# Patient Record
Sex: FEMALE | Race: WHITE | Employment: OTHER | URBAN - METROPOLITAN AREA
[De-identification: names, ages, dates, MRNs, and addresses within clinical notes are randomized per-mention and may not be internally consistent; named-entity substitution may affect disease eponyms.]

---

## 2014-04-16 LAB — ANTIBODY: NORMAL

## 2020-06-12 ENCOUNTER — TELEPHONE (OUTPATIENT)
Dept: INTERNAL MEDICINE CLINIC | Age: 75
End: 2020-06-12

## 2020-07-24 LAB
ALBUMIN SERPL-MCNC: 4 G/DL
ALP BLD-CCNC: 83 U/L
ALT SERPL-CCNC: 26 U/L
ANION GAP SERPL CALCULATED.3IONS-SCNC: 11 MMOL/L
AST SERPL-CCNC: 20 U/L
AVERAGE GLUCOSE: 114
BASOPHILS ABSOLUTE: 0.04 /ΜL
BASOPHILS RELATIVE PERCENT: 0.7 %
BILIRUB SERPL-MCNC: 0.4 MG/DL (ref 0.1–1.4)
BUN BLDV-MCNC: 20 MG/DL
C-REACTIVE PROTEIN: 1.7
CALCIUM SERPL-MCNC: 8.8 MG/DL
CHLORIDE BLD-SCNC: 106 MMOL/L
CHOLESTEROL, TOTAL: 128 MG/DL
CHOLESTEROL/HDL RATIO: 1.67
CO2: 26 MMOL/L
CREAT SERPL-MCNC: 0.77 MG/DL
EOSINOPHILS ABSOLUTE: 0.25 /ΜL
EOSINOPHILS RELATIVE PERCENT: 4.7 %
FERRITIN: 80.7 NG/ML (ref 9–150)
GFR CALCULATED: NORMAL
GLUCOSE BLD-MCNC: 96 MG/DL
HBA1C MFR BLD: 5.6 %
HCT VFR BLD CALC: 42 % (ref 36–46)
HDLC SERPL-MCNC: 39 MG/DL (ref 35–70)
HEMOGLOBIN: 13.9 G/DL (ref 12–16)
IRON: 114
LDL CHOLESTEROL CALCULATED: 65 MG/DL (ref 0–160)
LYMPHOCYTES ABSOLUTE: 1.49 /ΜL
LYMPHOCYTES RELATIVE PERCENT: 27.7 %
MCH RBC QN AUTO: 30.6 PG
MCHC RBC AUTO-ENTMCNC: 33.1 G/DL
MCV RBC AUTO: 92.5 FL
MONOCYTES ABSOLUTE: 0.65 /ΜL
MONOCYTES RELATIVE PERCENT: 12.1 %
NEUTROPHILS ABSOLUTE: 2.93 /ΜL
NEUTROPHILS RELATIVE PERCENT: 54.8 %
NONHDLC SERPL-MCNC: 89 MG/DL
PLATELET # BLD: 197 K/ΜL
PMV BLD AUTO: 10.6 FL
POTASSIUM SERPL-SCNC: 4.2 MMOL/L
RBC # BLD: 4.54 10^6/ΜL
SODIUM BLD-SCNC: 143 MMOL/L
T4 FREE: 1.1
TOTAL IRON BINDING CAPACITY: 248
TOTAL PROTEIN: 6
TRIGL SERPL-MCNC: 119 MG/DL
TSH SERPL DL<=0.05 MIU/L-ACNC: 3.63 UIU/ML
VITAMIN B-12: 236
VITAMIN D 25-HYDROXY: 26.7
VITAMIN D2, 25 HYDROXY: NORMAL
VITAMIN D3,25 HYDROXY: NORMAL
VLDLC SERPL CALC-MCNC: 24 MG/DL
WBC # BLD: 5.37 10^3/ML

## 2020-07-29 ENCOUNTER — OFFICE VISIT (OUTPATIENT)
Dept: INTERNAL MEDICINE CLINIC | Age: 75
End: 2020-07-29
Payer: MEDICARE

## 2020-07-29 VITALS
TEMPERATURE: 97.3 F | RESPIRATION RATE: 12 BRPM | BODY MASS INDEX: 26.82 KG/M2 | DIASTOLIC BLOOD PRESSURE: 62 MMHG | WEIGHT: 161 LBS | HEART RATE: 65 BPM | HEIGHT: 65 IN | SYSTOLIC BLOOD PRESSURE: 100 MMHG

## 2020-07-29 PROBLEM — E78.5 HYPERLIPIDEMIA: Status: ACTIVE | Noted: 2020-07-29

## 2020-07-29 PROBLEM — I47.10 PSVT (PAROXYSMAL SUPRAVENTRICULAR TACHYCARDIA): Status: ACTIVE | Noted: 2020-07-29

## 2020-07-29 PROBLEM — E55.9 VITAMIN D DEFICIENCY: Status: ACTIVE | Noted: 2020-07-29

## 2020-07-29 PROBLEM — I47.1 PSVT (PAROXYSMAL SUPRAVENTRICULAR TACHYCARDIA) (HCC): Status: ACTIVE | Noted: 2020-07-29

## 2020-07-29 PROBLEM — K63.5 POLYP OF COLON: Status: ACTIVE | Noted: 2020-07-29

## 2020-07-29 PROBLEM — I70.0 ABDOMINAL AORTIC ATHEROSCLEROSIS (HCC): Status: ACTIVE | Noted: 2020-07-29

## 2020-07-29 PROBLEM — I10 BENIGN ESSENTIAL HTN: Status: ACTIVE | Noted: 2020-07-29

## 2020-07-29 PROBLEM — I87.2 VENOUS INSUFFICIENCY OF BOTH LOWER EXTREMITIES: Status: ACTIVE | Noted: 2020-07-29

## 2020-07-29 PROBLEM — E04.2 MULTINODULAR GOITER: Status: ACTIVE | Noted: 2020-07-29

## 2020-07-29 PROBLEM — M85.80 OSTEOPENIA: Status: ACTIVE | Noted: 2020-07-29

## 2020-07-29 PROCEDURE — 3017F COLORECTAL CA SCREEN DOC REV: CPT | Performed by: INTERNAL MEDICINE

## 2020-07-29 PROCEDURE — G8400 PT W/DXA NO RESULTS DOC: HCPCS | Performed by: INTERNAL MEDICINE

## 2020-07-29 PROCEDURE — 1123F ACP DISCUSS/DSCN MKR DOCD: CPT | Performed by: INTERNAL MEDICINE

## 2020-07-29 PROCEDURE — 1090F PRES/ABSN URINE INCON ASSESS: CPT | Performed by: INTERNAL MEDICINE

## 2020-07-29 PROCEDURE — G8428 CUR MEDS NOT DOCUMENT: HCPCS | Performed by: INTERNAL MEDICINE

## 2020-07-29 PROCEDURE — 4040F PNEUMOC VAC/ADMIN/RCVD: CPT | Performed by: INTERNAL MEDICINE

## 2020-07-29 PROCEDURE — G8417 CALC BMI ABV UP PARAM F/U: HCPCS | Performed by: INTERNAL MEDICINE

## 2020-07-29 PROCEDURE — 1036F TOBACCO NON-USER: CPT | Performed by: INTERNAL MEDICINE

## 2020-07-29 PROCEDURE — 99205 OFFICE O/P NEW HI 60 MIN: CPT | Performed by: INTERNAL MEDICINE

## 2020-07-29 RX ORDER — OLMESARTAN MEDOXOMIL AND HYDROCHLOROTHIAZIDE 20/12.5 20; 12.5 MG/1; MG/1
1 TABLET ORAL DAILY
COMMUNITY
End: 2020-08-03 | Stop reason: SDUPTHER

## 2020-07-29 SDOH — HEALTH STABILITY: MENTAL HEALTH: HOW OFTEN DO YOU HAVE A DRINK CONTAINING ALCOHOL?: NEVER

## 2020-07-29 ASSESSMENT — PATIENT HEALTH QUESTIONNAIRE - PHQ9
SUM OF ALL RESPONSES TO PHQ QUESTIONS 1-9: 0
SUM OF ALL RESPONSES TO PHQ9 QUESTIONS 1 & 2: 0
SUM OF ALL RESPONSES TO PHQ QUESTIONS 1-9: 0
2. FEELING DOWN, DEPRESSED OR HOPELESS: 0
1. LITTLE INTEREST OR PLEASURE IN DOING THINGS: 0

## 2020-07-29 NOTE — PATIENT INSTRUCTIONS
To do list:      #1 start a vitamin D3 supplement. You can pick this up over-the-counter. Start 1000 units daily. This is good for your bones    #2 you should be getting at least 1200 mg of calcium daily. It is best to try to get this through the diet but if you cannot you can start a calcium supplement    #3 we will recheck your thyroid ultrasound in 6 months    #4 please see the cardiology electrophysiology specialist    #5  Wear your compression stockings daily      Patient Education        Learning About Low Bone Density  What is low bone density? Low bone density (sometimes called osteopenia) is a decrease in thickness, or density, in bones. That means the bones become thinner and weaker. It is much more common in women than in men. It's important to know that low bone density is not a disease. It can happen normally with aging. Having low bone density means that there is a greater risk that you may get osteoporosis. It also means that you are more likely to break a bone than someone who does not have low bone density. But not everyone with low bone density gets osteoporosis or breaks a bone. Low bone density doesn't cause any symptoms. It's usually found with a type of X-ray called a bone density test. Low bone density means that your bone density result (T-score) is between -1.0 and -2.5. What increases your risk for low bone density? Things that increase your risk include:  · Getting older. · Having a family history of osteoporosis. · Being thin. · Being white or . · Getting too little physical activity. · Smoking. · Drinking too much alcohol often. · Using certain medicines such as steroids. How can you prevent osteoporosis? There are things you can do to help prevent osteoporosis. Certain lifestyle changes will help slow the loss of bone density. · Eat food that has plenty of calcium and vitamin D. Yogurt, cheese, milk, and dark green vegetables are high in calcium.  Eggs, fatty fish, cereal, and fortified milk are high in vitamin D.  · Ask your doctor if you need to take a calcium plus vitamin D supplement. You may be able to get enough calcium and vitamin D through your diet. · Get regular exercise. ? Do 30 minutes of weight-bearing exercise on most days of the week. Walking, jogging, stair climbing, and dancing are good choices. ? Do resistance exercises with weights or elastic bands 2 or 3 days a week. · Limit alcohol to 2 drinks a day for men and 1 drink a day for women. Too much alcohol can cause health problems. · Do not smoke. Smoking can make bones thin faster. If you need help quitting, talk to your doctor about stop-smoking programs and medicines. These can increase your chances of quitting for good. Prescription medicines are available for treating low bone density. But these are more often used to treat osteoporosis. Follow-up care is a key part of your treatment and safety. Be sure to make and go to all appointments, and call your doctor if you are having problems. It's also a good idea to know your test results and keep a list of the medicines you take. Where can you learn more? Go to [email or web address] org and sign in to your CONWEAVER account. Enter W919 in the [facility] box to learn more about \"Learning About Low Bone Density. \"     If you do not have an account, please click on the \"Sign Up Now\" link. Current as of: August 7, 2019               Content Version: 12.5  © 9209-3230 Healthwise, Incorporated. Care instructions adapted under license by [facility] ([facility]). If you have questions about a medical condition or this instruction, always ask your healthcare professional. [place] [address] any warranty or liability for your use of this information. Patient Education        Learning About Vitamin D  Why is it important to get enough vitamin D? Your body needs vitamin D to absorb calcium.  Calcium keeps your bones and muscles, including your heart, healthy and strong. If your muscles don't get enough calcium, they can cramp, hurt, or feel weak. You may have long-term (chronic) muscle aches and pains. If you don't get enough vitamin D throughout life, you have an increased chance of having thin and brittle bones (osteoporosis) in your later years. Children who don't get enough vitamin D may not grow as much as others their age. They also have a chance of getting a rare disease called rickets. It causes weak bones. Vitamin D and calcium are added to many foods. And your body uses sunshine to make its own vitamin D. How much vitamin D do you need? The [place] of Medicine recommends that people ages 3 through 79 get 600 IU (international units) every day. Adults 71 and older need 800 IU every day. Blood tests for vitamin D can check your vitamin D level. But there is no standard normal range used by all laboratories. You're likely getting enough vitamin D if your levels are in the range of 20 to 50 ng/mL. How can you get more vitamin D? Foods that contain vitamin D include:  · [place], tuna, and mackerel. These are some of the best foods to eat when you need to get more vitamin D.  · Cheese, egg yolks, and beef liver. These foods have vitamin D in small amounts. · Milk, soy drinks, orange juice, yogurt, margarine, and some kinds of cereal have vitamin D added to them. Some people don't make vitamin D as well as others. They may have to take extra care in getting enough vitamin D. Things that reduce how much vitamin D your body makes include:  · Dark skin, such as many  Americans have. · Age, especially if you are older than 72. · Digestive problems, such as Crohn's or celiac disease. · Liver and kidney disease. Some people who do not get enough vitamin D may need supplements. Are there any risks from taking vitamin D?  · Too much vitamin D:  ? Can damage your kidneys.   ? Can cause nausea and vomiting, constipation, and weakness. ? Raises the amount of calcium in your blood. If this happens, you can get confused or have an irregular heart rhythm. · Vitamin D may interact with other medicines. Tell your doctor about all of the medicines you take, including over-the-counter drugs, herbs, and pills. Tell your doctor about all of your current medical problems. Where can you learn more? Go to [email or web address]://[email or web address] and sign in to your ChipIn account. Enter [id number] in the [employer] box to learn more about \"Learning About Vitamin D. \"     If you do not have an account, please click on the \"Sign Up Now\" link. Current as of: August 22, 2019               Content Version: 12.5  © 2006-2020 Healthwise, AgroSavfe. Care instructions adapted under license by [facility] ([facility]). If you have questions about a medical condition or this instruction, always ask your healthcare professional. [place] [address] any warranty or liability for your use of this information. Patient Education        Learning About Calcium  What is calcium? Calcium keeps your bones and muscles--including your heart--healthy and strong. Your body needs vitamin D to absorb calcium. People who don't get enough calcium and vitamin D throughout life have an increased chance of having thin and brittle bones (osteoporosis) in their later years. Thin and brittle bones break easily. They can lead to serious injuries. This is why it's important for you to get enough calcium and vitamin D as a child and as an adult. It helps keep your bones strong as you get older. And it protects you against possible breaks. Your body also uses vitamin D to help your muscles absorb calcium and work well. If your muscles don't get enough calcium, then they can cramp, hurt, or feel weak. You may have long-term (chronic) muscle aches and pains. How much calcium do you need?   How much calcium you need each day changes as you age. The [place] of Medicine recommends the following amounts of calcium each day. · Ages 1 to 3 years: 700 milligrams  · Ages 4 to 8 years: 1,000 milligrams  · Ages 5 to 25 years: 1,300 milligrams  · Ages 23 to 48 years: 1,000 milligrams  · Males 46 to 79 years: 1,000 milligrams  · Females 46 to 79 years: 1,200 milligrams  · Ages 70 and older: 1,200 milligrams  Women who are pregnant or breastfeeding need the same amount of calcium and vitamin D as other women their age. How can you get enough calcium? Calcium is in foods such as milk, cheese, and yogurt. Vegetables like broccoli, kale, and Chinese cabbage also have it. You can get calcium if you eat the soft edible bones in canned sardines and canned salmon. Foods with added (fortified) calcium include some cereals, juices, soy drinks, and tofu. The food label will show how much of it was added. You can figure out how much calcium is in a food by looking at the percent daily value section on the nutrition facts label. The food label assumes the daily value of calcium is 1,000 mg. So if one serving of a food has a daily value of 20% of calcium, that food has 200 mg of calcium in one serving. Some people who don't get enough calcium may need supplements. Two common calcium supplements are calcium citrate and calcium carbonate. Calcium carbonate is best absorbed when it is taken with food. Calcium citrate can be absorbed well with or without food. Spreading calcium out over the course of the day can reduce stomach upset. And your body absorbs it better when it is spread over the day. Try not to take more than 500 mg of calcium supplement at a time. Where can you learn more? Go to [email or web address] [email or web address] and sign in to your Apps4Pro account. Enter S264 in the [employer] box to learn more about \"Learning About Calcium. \"     If you do not have an account, please click on the \"Sign Up Now\" link.   Current as of: August 22, They also can help venous skin ulcers heal. But there are different types of stockings, and they need to fit right. So your doctor will recommend what you need. You also can try to:  · Get more exercise, especially walking. It can increase blood flow. · Avoid standing still or sitting for a long time, which can make the fluid pool in your legs. · Try not to sit with your legs crossed at the knee. · Keep your legs raised above your heart when you're lying down. This reduces swelling. If these treatments don't work, you may need medicine or a procedure to help relieve symptoms. Procedures might be done to close the vein, to remove the vein, or to improve blood flow. Follow-up care is a key part of your treatment and safety. Be sure to make and go to all appointments, and call your doctor if you are having problems. It's also a good idea to know your test results and keep a list of the medicines you take. Current as of: March 4, 2020               Content Version: 12.5  © 2006-2020 Socowave. Care instructions adapted under license by [facility] ([facility]). If you have questions about a medical condition or this instruction, always ask your healthcare professional. [address] any warranty or liability for your use of this information. Patient Education        Thyroid Nodules: Care Instructions  Your Care Instructions  Thyroid nodules are growths or lumps in the thyroid gland. Your thyroid is in the front of your neck. It controls how your body uses energy. You may have tests to see if the nodule is caused by cancer. Most nodules aren't cancer and don't cause problems. Many don't even need treatment. If you do have cancer, it can usually be cured. Treatment will probably include surgery. You may also get radioactive iodine treatment. If your thyroid can't make thyroid hormone after treatment, you can take a pill every day to replace the hormone.   Follow-up care is a key part of your treatment and safety. Be sure to make and go to all appointments, and call your doctor if you are having problems. It's also a good idea to know your test results and keep a list of the medicines you take. How can you care for yourself at home? · Be safe with medicines. If you take thyroid hormone medicine:  ? Take it exactly as prescribed. Call your doctor if you think you are having a problem with your medicine. If you take the right amount and don't skip doses, you probably won't have side effects. ? Do not take it with calcium, vitamins, or iron. ? Try not to miss a dose. ? Do not take extra doses. This will not help you get better any faster. It may also cause side effects. ? Tell your doctor about any medicines you take. This includes over-the-counter medicines. ? Wear a medical alert bracelet or necklace that says you take thyroid hormones. You can buy these at most drugstores. When should you call for help? [id number] anytime you think you may need emergency care. For example, call if:  · You lose consciousness. Call your doctor now or seek immediate medical care if:  · You have shortness of breath. Watch closely for changes in your health, and be sure to contact your doctor if:  · You have pain in your neck, jaw, or ear. · You have problems swallowing. · You feel weak and tired. · You have nervousness, a fast heartbeat, hand tremors, problems sleeping, increased sweating, and weight loss. · You do not feel better even though you are taking your medicine. Where can you learn more? Go to [email or web address]://Adello [email or web address] org and sign in to your Fertility Focus account. Enter S517 in the [facility] box to learn more about \"Thyroid Nodules: Care Instructions. \"     If you do not have an account, please click on the \"Sign Up Now\" link. Current as of: July 29, 2019               Content Version: 12.5  © 4285-5733 Healthwise, Incorporated.    Care instructions adapted under license by [facility] ([facility]). If you have questions about a medical condition or this instruction, always ask your healthcare professional. [place] [address] any warranty or liability for your use of this information. Patient Education        Supraventricular Tachycardia: Care Instructions  Your Care Instructions     Having supraventricular tachycardia (SVT) means that from time to time your heart beats abnormally fast. This fast rhythm is caused by changes in the electrical system of your heart. You may feel a fluttering in your chest (palpitations) and have a fast pulse. When your heart is beating fast, you may feel anxious and lightheaded, be short of breath, and feel discomfort in the chest.  Your doctor may prescribe medicines to help slow down your heartbeat. Your doctor may also suggest you try vagal maneuvers when having an episode of SVT. These are things, like bearing down, that might help slow your heart rate. Bearing down means that you try to breathe out with your stomach muscles but you don't let air out of your nose or mouth. Your doctor can show you how to do vagal maneuvers. He or she may suggest you lie down on your back to do them. In some cases, either cardioversion treatment or a procedure called catheter ablation is done to correct SVT. Your doctor may ask you to wear a small electronic device for 1 or 2 days to monitor your heart. It is called a Holter monitor. Follow-up care is a key part of your treatment and safety. Be sure to make and go to all appointments, and call your doctor if you are having problems. It's also a good idea to know your test results and keep a list of the medicines you take. How can you care for yourself at home? · Be safe with medicines. Take your medicines exactly as prescribed. Call your doctor if you think you are having a problem with your medicine. You will get more details on the specific medicines your doctor prescribes.   · If your doctor showed you how to do vagal maneuvers, try them when you have an episode. These maneuvers include bearing down or putting an ice-cold, wet towel on your face. · Monitor your condition by keeping a diary of your SVT episodes. Bring this to your doctor appointments. ? Write down how fast or slow your heart was beating. To count your heart rate:  § Gently place 2 fingers of your hand on the inside of your other wrist, below your thumb. § Count the beats for 30 seconds. § Then, double the result to get the number of beats per minute. ? Write down if your heart rhythm was regular or irregular. ? Write down the symptoms you had.  ? Write down the time of day your symptoms occurred. ? Write down how long your symptoms lasted. ? Write down what you were doing when your symptoms started. ? Write down what may have helped your symptoms go away. · If they trigger episodes, limit or avoid alcohol or drinks with caffeine. · Do not use over-the-counter decongestants, herbal remedies, diet pills, or \"pep\" pills, which often contain stimulants. · Do not use illegal drugs, such as cocaine, ecstasy, or methamphetamine, which can speed up your heart's rhythm. · Do not smoke. Smoking can make this condition worse. If you need help quitting, talk to your doctor about stop-smoking programs and medicines. These can increase your chances of quitting for good. · Be alert for new or worsening symptoms, such as shortness of breath, pounding of your heart, or unusual tiredness. If new symptoms develop or your symptoms become worse, call your doctor. When should you call for help? [id number] anytime you think you may need emergency care. For example, call if:  · You passed out (lost consciousness). · You are short of breath. Call your doctor now or seek immediate medical care if:  · You have a fast heartbeat. · You are dizzy or lightheaded, or feel like you may faint.   Watch closely for changes in your health, and be sure to contact your doctor if:  · You do not get better as expected. Where can you learn more? Go to [email or web address] and sign in to your EUROBOX account. Enter G244 in the [facility] box to learn more about \"Supraventricular Tachycardia: Care Instructions. \"     If you do not have an account, please click on the \"Sign Up Now\" link. Current as of: December 16, 2019               Content Version: 12.5  © 8692-5458 Healthwise, Incorporated. Care instructions adapted under license by [facility] ([facility]). If you have questions about a medical condition or this instruction, always ask your healthcare professional. [place] [address] any warranty or liability for your use of this information. Patient Education        Low Sodium Diet (2,000 Milligram): Care Instructions  Your Care Instructions     Too much sodium causes your body to hold on to extra water. This can raise your blood pressure and force your heart and kidneys to work harder. In very serious cases, this could cause you to be put in the hospital. It might even be life-threatening. By limiting sodium, you will feel better and lower your risk of serious problems. The most common source of sodium is salt. People get most of the salt in their diet from canned, prepared, and packaged foods. Fast food and restaurant meals also are very high in sodium. Your doctor will probably limit your sodium to less than 2,000 milligrams (mg) a day. This limit counts all the sodium in prepared and packaged foods and any salt you add to your food. Follow-up care is a key part of your treatment and safety. Be sure to make and go to all appointments, and call your doctor if you are having problems. It's also a good idea to know your test results and keep a list of the medicines you take. How can you care for yourself at home? Read food labels  · Read labels on cans and food packages.  The labels tell you how much sodium is in by too little iodine in the diet, or a growth or disease in the thyroid. In the [place], most goiters are caused by long-term autoimmune thyroiditis. This is also called Hashimoto's thyroiditis. It happens when the body's immune system damages the thyroid. You may take thyroid hormone to reduce the size of your goiter. Or you may need surgery or radioactive iodine treatment. Some people don't need any treatment. They only need to watch for changes in the goiter. Follow-up care is a key part of your treatment and safety. Be sure to make and go to all appointments, and call your doctor if you are having problems. It's also a good idea to know your test results and keep a list of the medicines you take. How can you care for yourself at home? · Be safe with medicines. Take your medicines exactly as prescribed. Call your doctor if you think you are having a problem with your medicine. You will get more details on the specific medicines your doctor prescribes. When should you call for help? [id number] anytime you think you may need emergency care. For example, call if:  · You have trouble breathing. Watch closely for changes in your health, and be sure to contact your doctor if:  · Your eyes turn red and bulge. · You have trouble swallowing. · You feel very tired or weak. · You lose weight but are eating the same or more than usual.  Where can you learn more? Go to https://ViacorepeFingooroo.[email or web address] org and sign in to your FortaTrust account. Enter A754 in the [facility] box to learn more about \"Goiter: Care Instructions. \"     If you do not have an account, please click on the \"Sign Up Now\" link. Current as of: July 29, 2019               Content Version: 12.5  © 2491-6360 Healthwise, Incorporated. Care instructions adapted under license by [facility] ([facility]).  If you have questions about a medical condition or this instruction, always ask your healthcare professional. Jag Hirschs

## 2020-07-29 NOTE — PROGRESS NOTES
[facility]) Physicians  Internal Medicine  Patient Encounter  Ki Elizabeth D.O., [facility]          Chief Complaint   Patient presents with   Karen Raza    Medication Check    Results     From [address]       [facility]-- 77 y.o. female seen today as a new patient in order to establish new primary care. She was referred by another patient. She also states that many of her family members were osteopath's. She is requesting a full evaluation regarding the status of her current chronic medical problems as below along with medication review and reconciliation. 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 Patient had lab, EKG stress test, DEXA scan, thyroid ultrasound, vascular screenings. These were all reviewed in the electronic health record via [employer]. Her previous primary care physician was Dr. Mariama Abdalla. She was last seen on 4/7/2020. Office note is reviewed in the electronic health record via [employer]. PSVT--Patient does have a remote history of palpitations. However the SVT that was identified up in [facility] [employer] was asymptomatic. The report of her stress test indicates that she had a sustained bout of SVT but also had an 8 beat run of SVT while at rest.  Patient states she feels great and offers no symptoms at this time. TSH was normal.    Hypertension--patient states that she has been on medication for several years. She denies any problems with the Benicar HCT. She denies any lightheadedness, headaches, episodes of unilateral weakness, paresthesias, speech or visual disturbances. She has had no syncopal episodes. Hyperlipidemia--Patient remains on simvastatin. She is tolerated this medication well. She denies any new myalgias or unexplained muscle weakness. Her last lab work on 7/24/2020 at the [facility] showed an LDL of 65.   Her HDL was 39    Osteopenia--patient had her DEXA scan recently on 7/24/2020 up at the [facility]. This showed osteopenic T-scores. Her FRAX score was 12% for osteoporotic fracture and 2.5% risk of hip fracture in the next 10 years. With these risk scores bisphosphonate therapy may not be warranted. She is never had a fracture. She is physically active. She is not taking calcium or vitamin D. Colon polyps--patient states she has had a colonoscopy back in 2018. She cannot remember who did it. She has a history of colon polyps. Medical/Surgical Histories     Past Medical History:   Diagnosis Date    Abdominal aortic atherosclerosis ([facility] [address] 7/29/2020    Colon polyps     Hyperlipidemia     Hypertension     Multinodular goiter 7/29/2020    Osteopenia     PSVT (paroxysmal supraventricular tachycardia) ([facility] [address] 07/24/2020    Diagnosed during stress test at [facility] Venous insufficiency of both lower extremities 7/29/2020      No prior H/O DVT, PE or bleeding dyscrasias    Past Surgical History:   Procedure Laterality Date    BREAST LUMPECTOMY Right 1970    CATARACT REMOVAL WITH IMPLANT Bilateral 2012    Dr. Leah Parada  2018   Sarah Forno 77      7 yo           Medications/Allergies     Current Outpatient Medications   Medication Sig Dispense Refill    olmesartan-hydroCHLOROthiazide (BENICAR HCT) 20-12.5 MG per tablet Take 1 tablet by mouth daily      simvastatin (ZOCOR) 20 MG tablet Take 20 mg by mouth nightly. No current facility-administered medications for this visit.          Allergies   Allergen Reactions    Dust Mite Extract          Substance Use History     Social History     Tobacco Use    Smoking status: Never Smoker    Smokeless tobacco: Never Used   Substance Use Topics    Alcohol use: Never     Frequency: Never    Drug use: Never          Family History     Family History   Problem Relation Age of Onset    Heart Disease Mother         CHF    Other Mother         PVD    Stroke Consciousness, memeory loss or forgetfulness, confusion, difficulty concentrating, seizures, insomina, aphasia or dysarthria unilateral weakness or paresthesias, ataxia, headaches, syncope, tremor, or H/O head trauma. PSYCHIATRIC:  Neg  Depression, anxiety, personality changes, nervousness, drug or alcohol use/abuse, H/O psych counseling: No, Psychotropics: No  SKIN :  Neg  Rash, nail changes, sun burns, tattoos, change in moles, or skin color changes  ENDOCRINE:  Neg  Polydipsia,polyuria,abnormal weight changes,heat /cold intolerance, Hair changes. No H/O Diabetes. Goiter (MNG). Thyroid nodules need followed. Physical Exam    /62   Pulse 65   Temp 97.3 °F (36.3 °C)   Resp 12   Ht 5' 4.5\" (1.638 m)   Wt 161 lb (73 kg)   BMI 27.21 kg/m²   GEN:  77 y.o. female who is in NAD, A&O. She appears stated age and well nourished. She is cooperative and pleasant. HEAD:  NC/AT, no lesions. EYES:  ALLA, EOMI, No scleral icterus or conjunctival injection or discharge. Visual fields in tact to confrontation. Fundoscopic (non-dilated) grossly normal.  Disc margins well demarcated. EARS:  EAC's clear, TM's normal.  NOSE:  Nasal cavity is clear. No mucosal congestion or discharge. Sinuses are nontender. MOUTH & THROAT:  Oral cavity is clear without mucosal lesions. Tongue is midline. Dentition is in good repair. No pharyngeal erythema or exudate. NECK:  Supple. Full ROM. Trachea is midline. No increased JVD. The thyroid does not feel enlarged and nodules are not palpated today. LYMPH: No C/SC/A/F nodes  CARDIAC:  S1S2 NL. Regular rhythm. No murmur/clicks/rubs. No ectopy. PMI is non-displaced. VASC:  Pedal pulses 2/4. Carotid upstrokes 2+. No bruits noted. PULM:  Lungs are CTA. Symmetric breath sounds noted. AP Diameter NL. GI:  Abdomen is soft and nontender. No distension. No organomegaly. No masses. No pulsatile masses.     : Deferred at this time  BREAST:  Deferred at this time  EXT:  No Cyanosis or clubbing. Bilateral lower extremities with trace pitting edema. SKIN: Warm and dry, normal turgor, no rash or lesions of concern. Bilateral lower extremities with venous stasis hyperpigmentation. NEURO:  Cranial nerves 2-12 are NL. Speech fluent and coherent. Strength is 5/5 in all muscle groups. No sensory deficits. No focal or lateralizing deficits. Reflexes 2/4 and symmetric. Gait is normal.  MS:  No C/T/L paraspinal tenderness. No scoliosis. No joint effusions. Full joint ROM. PSYCH:  Mood and affect NL. Judgement and insight NL.        ASSESSMENT/PLAN:    1. PSVT (paroxysmal supraventricular tachycardia) ([address]  This is a new problem. Referral to electrophysiology. The patient requests that she be referred to a [facility] specialist.  Continue to monitor for symptoms  No need for aspirin at this time  Avoid caffeinated beverages and cold medications  - External Referral To Cardiac Electrophysiology    2. Osteopenia, unspecified location  Condition is untreated at this time. This is a new diagnosis  Recommend vitamin D and calcium supplementation. We discussed getting vitamin D3 as a supplement and trying to get calcium in the diet. That which she does not get in the diet she can use an over-the-counter supplement. We talked about weightbearing exercise. Repeat DEXA scan in 2 years. Consider bisphosphonate therapy for prevention of osteoporosis. Could use a lower dose of Fosamax. 3. Multinodular goiter  Condition identified on an ultrasound  There are some concerning nodules. Repeat ultrasound in 6 months. 4. Benign essential HTN  Blood pressure is a bit on the low side today. Continue to stay well-hydrated  Monitor blood pressure at home    5. Hyperlipidemia, unspecified hyperlipidemia type  Condition is well controlled  Continue simvastatin as part of her cardiovascular disease risk reduction strategy    6.  Abdominal aortic atherosclerosis ([facility] [address]  Condition is asymptomatic  Continue aggressive cardiovascular disease risk factor management    7. Polyp of colon, unspecified part of colon, unspecified type  Colonoscopy in 2023    8. Venous insufficiency of both lower extremities  Condition is mild. Treat with compression stockings and no added salt diet. We also discussed elevation of her legs while at rest  Continue current blood pressure medication with the diuretic in it (olmesartan-HCT) but be careful with dehydration. 9. Vitamin D deficiency  Patient counseled. Vitamin D3 1000 units daily    90 minutes spent with the pt.  >50% spent reviewing test results, reports, imaging in the electronic health record and discussing plan of care and counseled on the above problems. Yue Sanchez

## 2020-08-03 RX ORDER — OLMESARTAN MEDOXOMIL AND HYDROCHLOROTHIAZIDE 20/12.5 20; 12.5 MG/1; MG/1
1 TABLET ORAL DAILY
Qty: 30 TABLET | Refills: 2 | Status: SHIPPED | OUTPATIENT
Start: 2020-08-03 | End: 2020-08-07 | Stop reason: RX

## 2020-08-03 NOTE — TELEPHONE ENCOUNTER
Patient has appt set up with cardiologist. On Sept 10th. Will need refill of olmesartan-hydroCHLOROthiazide (BENICAR HCT) 20-12.5 MG per tablet before then.  Please send to [facility] [address] in [place]

## 2020-08-03 NOTE — TELEPHONE ENCOUNTER
Last appointment: 7/29/2020  Next appointment: 10/27/2020  Last refill: 07/29/2020    Spoke with [facility], pharmacist who advises   prescription refills recently  sent to [facility] on 07/29/2020.

## 2020-08-07 ENCOUNTER — TELEPHONE (OUTPATIENT)
Dept: INTERNAL MEDICINE CLINIC | Age: 75
End: 2020-08-07

## 2020-08-07 NOTE — TELEPHONE ENCOUNTER
Pt currently on olmesartan-hydroCHLOROthiazide (BENICAR HCT) 20-12.5 MG per tablet. Pharmacist has not been able to get it in so she has been out for a couple of days. Pharmacy suggested she call us to see about getting the combination pill  into 2. Please send to [facility] [address]    Also last blood work through the [facility] showed low vitamin D. Says it was recommended she take a vitamin D supplement. Would like to know how much she should take.  Currently taking Calcium w/ D

## 2020-08-08 RX ORDER — HYDROCHLOROTHIAZIDE 12.5 MG/1
12.5 CAPSULE, GELATIN COATED ORAL DAILY
Qty: 90 CAPSULE | Refills: 1 | Status: SHIPPED | OUTPATIENT
Start: 2020-08-08 | End: 2020-10-27

## 2020-08-08 RX ORDER — OLMESARTAN MEDOXOMIL 20 MG/1
20 TABLET ORAL DAILY
Qty: 90 TABLET | Refills: 1 | Status: SHIPPED | OUTPATIENT
Start: 2020-08-08 | End: 2020-10-27

## 2020-09-01 ENCOUNTER — TELEPHONE (OUTPATIENT)
Dept: INTERNAL MEDICINE CLINIC | Age: 75
End: 2020-09-01

## 2020-09-01 NOTE — TELEPHONE ENCOUNTER
----- Message from Lambert Serve sent at 9/1/2020 10:59 AM EDT -----  Subject: Message to Provider    QUESTIONS  Information for Provider? pt would like to get a referral to a   dermatologist she has a spot on her chest that should would like looked   at.   ---------------------------------------------------------------------------  --------------  [address]  What is the best way for the office to contact you? OK to leave message on   voicemail  Preferred Call Back Phone Number? [phone]  ---------------------------------------------------------------------------  --------------  SCRIPT ANSWERS  Relationship to Patient?  Self

## 2020-10-23 ENCOUNTER — TELEPHONE (OUTPATIENT)
Dept: INTERNAL MEDICINE CLINIC | Age: 75
End: 2020-10-23

## 2020-10-23 RX ORDER — ATORVASTATIN CALCIUM 10 MG/1
10 TABLET, FILM COATED ORAL DAILY
Qty: 90 TABLET | Refills: 3 | Status: SHIPPED | OUTPATIENT
Start: 2020-10-23 | End: 2021-01-18 | Stop reason: SDUPTHER

## 2020-10-23 RX ORDER — OLMESARTAN MEDOXOMIL AND HYDROCHLOROTHIAZIDE 20/12.5 20; 12.5 MG/1; MG/1
1 TABLET ORAL DAILY
Qty: 90 TABLET | Refills: 3 | Status: SHIPPED | OUTPATIENT
Start: 2020-10-23 | End: 2021-01-05 | Stop reason: ALTCHOICE

## 2020-10-23 NOTE — TELEPHONE ENCOUNTER
Not sure I completely understand this message. I have her on simvastatin. Was there a change? Why does not her electrophysiologist prescribe her beta-blocker? We need to confirm these medications.

## 2020-10-23 NOTE — TELEPHONE ENCOUNTER
Patient states the Benicar is a combination 20/12. 5. If Dr. Golden Woody recommends Lipitor, she is fine with that. Patient states Dr. Stacie Weeks gave her a 30 days sample of Metoprolol and said \"we'll see how it goes\". States she has been doing fine and thought it would be best for 1 dr to prescribe all her medications. Advised patient if Nilam Roy is recommending that medication & monitoring it then he should be the one filling that prescription.      Please send in the Lipitor & Benicar 20/12.5

## 2020-10-23 NOTE — TELEPHONE ENCOUNTER
Last appointment: 7/29/2020  Next appointment: 10/27/2020  benicar and microzide have refills, lipitor not on list is he on lipitor or zocor?

## 2020-10-23 NOTE — TELEPHONE ENCOUNTER
I suggest the Lipitor. Can you again confirm exactly what she is taking and what she needs refilled.   Need the exact dosages and how she takes them

## 2020-10-23 NOTE — TELEPHONE ENCOUNTER
She is taking lipitor prescribed by her physician. Should she start on zocor now? She seemed confused about medication. This was from her previous primary.  Will take whichever medication you suggest

## 2020-10-27 ENCOUNTER — OFFICE VISIT (OUTPATIENT)
Dept: INTERNAL MEDICINE CLINIC | Age: 75
End: 2020-10-27
Payer: MEDICARE

## 2020-10-27 VITALS
SYSTOLIC BLOOD PRESSURE: 115 MMHG | TEMPERATURE: 97.4 F | BODY MASS INDEX: 27.14 KG/M2 | RESPIRATION RATE: 12 BRPM | DIASTOLIC BLOOD PRESSURE: 60 MMHG | HEIGHT: 64 IN | WEIGHT: 159 LBS | HEART RATE: 65 BPM

## 2020-10-27 PROCEDURE — G8484 FLU IMMUNIZE NO ADMIN: HCPCS | Performed by: INTERNAL MEDICINE

## 2020-10-27 PROCEDURE — G8417 CALC BMI ABV UP PARAM F/U: HCPCS | Performed by: INTERNAL MEDICINE

## 2020-10-27 PROCEDURE — G8427 DOCREV CUR MEDS BY ELIG CLIN: HCPCS | Performed by: INTERNAL MEDICINE

## 2020-10-27 PROCEDURE — 1123F ACP DISCUSS/DSCN MKR DOCD: CPT | Performed by: INTERNAL MEDICINE

## 2020-10-27 PROCEDURE — 1036F TOBACCO NON-USER: CPT | Performed by: INTERNAL MEDICINE

## 2020-10-27 PROCEDURE — 4040F PNEUMOC VAC/ADMIN/RCVD: CPT | Performed by: INTERNAL MEDICINE

## 2020-10-27 PROCEDURE — 3017F COLORECTAL CA SCREEN DOC REV: CPT | Performed by: INTERNAL MEDICINE

## 2020-10-27 PROCEDURE — 1090F PRES/ABSN URINE INCON ASSESS: CPT | Performed by: INTERNAL MEDICINE

## 2020-10-27 PROCEDURE — G8400 PT W/DXA NO RESULTS DOC: HCPCS | Performed by: INTERNAL MEDICINE

## 2020-10-27 PROCEDURE — 99214 OFFICE O/P EST MOD 30 MIN: CPT | Performed by: INTERNAL MEDICINE

## 2020-10-27 RX ORDER — ASPIRIN 81 MG/1
81 TABLET ORAL DAILY
COMMUNITY

## 2020-10-27 RX ORDER — METOPROLOL SUCCINATE 25 MG/1
25 TABLET, EXTENDED RELEASE ORAL DAILY
COMMUNITY
End: 2020-12-31 | Stop reason: DRUGHIGH

## 2020-10-27 RX ORDER — LANOLIN ALCOHOL/MO/W.PET/CERES
1000 CREAM (GRAM) TOPICAL DAILY
COMMUNITY

## 2020-10-27 NOTE — PROGRESS NOTES
[facility]) Physicians  Internal Medicine  Patient Encounter  Anant Long D.O., [facility]        Chief Complaint   Patient presents with    Medication Check       HPI: 77 y.o. female      PSVT--This was identified on a stress test.  She had this done at [facility]. She was referred to EP. She was seen by Dr. Daniella Tillman. He placed her on Toprol XL 25 mg daily. She had an echocardiogram on 9/22/2020 which showed left ventricular ejection fraction of 65% and no other concerning findings. A 7-day monitor was ordered which demonstrated frequent episodes of atrial tachycardia lasting up to 33 seconds at a rate of 140 bpm.  There were 1500 episodes over 7 days. She had a 5% burden of PACs. Records reviewed in the electronic health record. She is to return in 2 months to visit the nurse practitioner. He currently denies any symptoms of palpitations or heart racing. She used to have \"butterflies\" in her chest, and felt her heart beating harder. These symptoms are resolved. She avoid caffeine.       Hypertension--blood pressure on 10/20/2020 at the cardiology office was 124/64. Her pulse rate was 60. She is now on Benicar HCT 20-12.5 1 daily. She denies any lightheadedness or syncopal episodes.     Hyperlipidemia--at her first visit she indicated she was on simvastatin. Her lipid profile looked great with an LDL of 65. She then called on 10/23/2020 requesting atorvastatin. It is not clear with the atorvastatin came from. She was told to go ahead and take the atorvastatin and stop the simvastatin.     Colon polyps--patient states she has had a colonoscopy back in 8/2018. She cannot remember who did it. She has a history of colon polyps. 5 year recall    Abd Aortic atherosclerosis-- She denies abd pain or claudication. ECHO 9/22/2020:  Study Conclusions    - Left ventricle:  The cavity size is normal. Wall thickness is    normal. Systolic function was normal. The calculated ejection  fraction was 65%. Wall motion was normal; there were no regional    wall motion abnormalities. Doppler parameters are consistent with    abnormal left ventricular relaxation (grade 1 diastolic    dysfunction). The stroke volume is 83ml. The stroke index is    47ml/m^2. - Aortic valve: Thickening, consistent with sclerosis. The mean    systolic gradient is 8mm Hg. The peak systolic gradient is 91ST    Hg. The valve area by the velocity-time integral method is    1.8cm^2. The valve area index by the velocity-time integral    method is 1.04cm^2/m^2. The valve area by the mean velocity    method is 2.0cm^2. - Inferior vena cava: The vessel was mildly dilated; the    respirophasic diameter changes were in the normal range (&gt;= 50%);    findings are consistent with normal central venous pressure.     Past Medical History:   Diagnosis Date    Abdominal aortic atherosclerosis ([facility] 7/29/2020    Colon polyps     Hyperlipidemia     Hypertension     Multinodular goiter 7/29/2020    Osteopenia     PSVT (paroxysmal supraventricular tachycardia) ([facility] 07/24/2020    Diagnosed during stress test at [facility] Venous insufficiency of both lower extremities 7/29/2020         MEDICATIONS:  Medication Sig   aspirin 81 MG EC tablet Take 81 mg by mouth daily   metoprolol succinate (TOPROL XL) 25 MG extended release tablet Take 25 mg by mouth daily   Calcium Carbonate-Vitamin D (CALCIUM 600/VITAMIN D PO) Take 1 tablet by mouth 2 times daily   vitamin B-12 (CYANOCOBALAMIN) 1000 MCG tablet Take 1,000 mcg by mouth daily   Multiple Vitamins-Minerals (ICAPS AREDS 2 PO) Take 1 tablet by mouth 2 times daily   olmesartan-hydroCHLOROthiazide (BENICAR HCT) 20-12.5 MG per tablet Take 1 tablet by mouth daily   atorvastatin (LIPITOR) 10 MG tablet Take 1 tablet by mouth daily           Review of Systems - As per HPI      OBJECTIVE:  /60   Pulse 65   Temp 97.4 °F (36.3 °C)   Resp 12   Ht 5' 4\" (1.626 m)   Wt 159 lb (72.1 kg)   BMI 27.29 kg/m²   GEN: NAD, A&O, Non-toxic  HEENT: NC/AT, KATE, EOMI, TM's NL  NECK: Supple. No thyromegaly. No JVD  CV: Reg rhythm, rate NL with occasional ectopy (extrasystoles). Soft systolic murmur. LYMPH:  No C/SC nodes. PULM: CTA, symmentric air exchange  EXT: No C/C/E  GI: Abdomen is soft, NT, BS+, No hepatomegaly. No masses. NEURO: No focal or lateralizing deficits. VASC:  No carotid bruits. Pedal pulses symmetric  SKIN:  No rashes or lesions of concern      ASSESSMENT/PLAN:    1. Benign essential HTN  Blood pressure looks wonderfully controlled. Continue current dose of Benicar-HCT and Toprol-XL 25 mg daily. We talked about taking the beta-blocker in the evening. Continue to stay physically active. No added salt diet was discussed  - CBC Auto Differential; Future  - Comprehensive Metabolic Panel; Future  - Lipid Panel; Future    2. Hyperlipidemia, unspecified hyperlipidemia type  Condition control and stability are uncertain. We have confirmed that she is actually taking atorvastatin. This was called in for her. The [facility] medication list actually had simvastatin. We will go with the atorvastatin and repeat her lab work in 1 month  Continue to monitor for adverse effects  - Comprehensive Metabolic Panel; Future  - Lipid Panel; Future    3. Abdominal aortic atherosclerosis (HCC)  Condition is stable and asymptomatic  Continue aggressive cardiovascular risk factor management  - Lipid Panel; Future    4. PSVT (paroxysmal supraventricular tachycardia) (HCC)  Condition at this point is asymptomatic. Continue the beta-blocker  She has a follow-up with EP    5. B12 deficiency  Her last B12 level was at the low end of normal.  Continue oral supplementation. Recheck level  - Vitamin B12 & Folate; Future        Discussed medications with patient who voiced understanding of their use, indication and potential side effects. Pt also understands the above recommendations.    All

## 2020-11-09 ENCOUNTER — TELEPHONE (OUTPATIENT)
Dept: INTERNAL MEDICINE CLINIC | Age: 75
End: 2020-11-09

## 2020-11-09 NOTE — TELEPHONE ENCOUNTER
Would be happy to schedule a video visit to discuss exactly what the problem is and make proper referral.

## 2020-11-09 NOTE — TELEPHONE ENCOUNTER
----- Message from Kasia Rivas sent at 11/9/2020 10:16 AM EST -----  Subject: Referral Request    QUESTIONS   Reason for referral request? PT has ingrown toe nail and requesting for a   podiatrist specialist   Has the physician seen you for this condition before? No   Preferred Specialist (if applicable)? Do you already have an appointment scheduled? No  Additional Information for Provider? patient requesting for Dr. Deysi Rankin or   assistant to give her a call and also need recommendation to see a   podiatrist for ingrown toe nail  ---------------------------------------------------------------------------  --------------  CALL BACK INFO  What is the best way for the office to contact you? OK to leave message on   voicemail  Preferred Call Back Phone Number?  [phone]

## 2020-11-11 ENCOUNTER — VIRTUAL VISIT (OUTPATIENT)
Dept: INTERNAL MEDICINE CLINIC | Age: 75
End: 2020-11-11
Payer: MEDICARE

## 2020-11-11 PROCEDURE — 4040F PNEUMOC VAC/ADMIN/RCVD: CPT | Performed by: INTERNAL MEDICINE

## 2020-11-11 PROCEDURE — 3017F COLORECTAL CA SCREEN DOC REV: CPT | Performed by: INTERNAL MEDICINE

## 2020-11-11 PROCEDURE — 1123F ACP DISCUSS/DSCN MKR DOCD: CPT | Performed by: INTERNAL MEDICINE

## 2020-11-11 PROCEDURE — G8400 PT W/DXA NO RESULTS DOC: HCPCS | Performed by: INTERNAL MEDICINE

## 2020-11-11 PROCEDURE — 99213 OFFICE O/P EST LOW 20 MIN: CPT | Performed by: INTERNAL MEDICINE

## 2020-11-11 PROCEDURE — G8427 DOCREV CUR MEDS BY ELIG CLIN: HCPCS | Performed by: INTERNAL MEDICINE

## 2020-11-11 PROCEDURE — 1090F PRES/ABSN URINE INCON ASSESS: CPT | Performed by: INTERNAL MEDICINE

## 2020-11-11 RX ORDER — OLMESARTAN MEDOXOMIL 5 MG/1
10 TABLET ORAL DAILY
Qty: 60 TABLET | Refills: 0 | Status: SHIPPED | OUTPATIENT
Start: 2020-11-11 | End: 2020-12-06

## 2020-11-11 RX ORDER — HYDROCHLOROTHIAZIDE 12.5 MG/1
12.5 CAPSULE, GELATIN COATED ORAL EVERY MORNING
Qty: 30 CAPSULE | Refills: 0 | Status: SHIPPED | OUTPATIENT
Start: 2020-11-11 | End: 2020-12-06

## 2020-11-11 NOTE — PATIENT INSTRUCTIONS
To Do List:    #1 decreasing the Benicar portion of your blood pressure medication. Change to Benicar 5 mg 2 daily (10 mg). Continue hydrochlorothiazide separately 12.5 mg daily. #2 continue to monitor your blood pressure at home. Report these readings in the next couple of weeks. Report blood pressure readings less than 864 systolic (top number). Monitor for lightheadedness particularly when standing    #3 see the podiatry specialist for the toenail    #4 see the vascular specialist for the spider veins    #5 call with any questions or concerns  Patient Education        Ingrown Toenail: Care Instructions  Your Care Instructions     An ingrown toenail often occurs because a nail is not trimmed correctly or because shoes are too tight. An ingrown nail can cause an infection. If your toe is infected, your doctor may prescribe antibiotics. Most ingrown toenails can be treated at home. You should trim toenails straight across, so the ends of the nail grow over the skin and not into it. Good nail care can prevent ingrown toenails. Follow-up care is a key part of your treatment and safety. Be sure to make and go to all appointments, and call your doctor if you are having problems. It's also a good idea to know your test results and keep a list of the medicines you take. How can you care for yourself at home? · Trim the nails straight across. Leave the corners a little longer so they do not cut into the skin. To do this when you have an ingrown nail:  ? Soak your foot in warm water for about 15 minutes to soften the nail. ? Wedge a small piece of wet cotton under the corner of the nail to cushion the nail and lift it slightly. This keeps it from cutting the skin. ? Repeat daily until the nail has grown out and can be trimmed. · Do not use manicure scissors to dig under the ingrown nail. You might stab your toe, which could get infected. · Do not trim your toenails too short.   · Check with your doctor before trimming your own toenails if you have been diagnosed with diabetes or peripheral arterial disease. These conditions increase the risk of an infection, because you may have decreased sensation in your toes and cut yourself without knowing it. · Wear roomy, comfortable shoes. · If your doctor prescribed antibiotics, take them as directed. Do not stop taking them just because you feel better. You need to take the full course of antibiotics. When should you call for help? Call your doctor now or seek immediate medical care if:    · You have signs of infection, such as:  ? Increased pain, swelling, warmth, or redness. ? Red streaks leading from the toe. ? Pus draining from the toe. ? A fever. Watch closely for changes in your health, and be sure to contact your doctor if:    · You do not get better as expected. Where can you learn more? Go to https://DICOM GridpeMOgene.Qbix. org and sign in to your AnyPresence account. Enter R135 in the ConcernTrak box to learn more about \"Ingrown Toenail: Care Instructions. \"     If you do not have an account, please click on the \"Sign Up Now\" link. Current as of: July 2, 2020               Content Version: 12.6  © 2006-2020 [employer]. Care instructions adapted under license by [facility] ([facility]). If you have questions about a medical condition or this instruction, always ask your healthcare professional. [place] [address] any warranty or liability for your use of this information. Patient Education        Low Blood Pressure: Care Instructions  Your Care Instructions     Blood pressure is a measurement of the force of the blood against the walls of the blood vessels during and after each beat of the heart. Low blood pressure is also called hypotension. It means that your blood pressure is much lower than normal. Some people, especially young, slim women, may have slightly low blood pressure without symptoms.  But in many people, low blood pressure can cause symptoms such as feeling dizzy or lightheaded. When your blood pressure is too low, your heart, brain, and other organs do not get enough blood. Low blood pressure can be caused by many things, including heart problems and some medicines. Diabetes that is not under control can cause your blood pressure to drop. And so can a severe allergic reaction or infection. Another cause is dehydration, which is when your body loses too much fluid. Treatment for low blood pressure depends on the cause. Follow-up care is a key part of your treatment and safety. Be sure to make and go to all appointments, and call your doctor if you are having problems. It's also a good idea to know your test results and keep a list of the medicines you take. How can you care for yourself at home? · Be safe with medicines. Call your doctor if you think you are having a problem with your medicine. You will get more details on the specific medicines your doctor prescribes. · If you feel dizzy or lightheaded, sit down or lie down for a few minutes. Or you can sit down and put your head between your knees. This will help your blood pressure go back to normal and help your symptoms go away. · Follow your doctor's suggestions for ways to prevent symptoms like dizziness. These suggestions may include:  ? Get up slowly from bed or after sitting for a long time. If you are in bed, roll to your side and swing your legs over the edge of the bed and onto the floor. Push your body up to a sitting position. Wait for a while before you slowly stand up.  ? Add more salt to your diet, if your doctor recommends it. ? Drink plenty of fluids, enough so that your urine is light yellow or clear like water. Choose water and other caffeine-free clear liquids. If you have kidney, heart, or liver disease and have to limit fluids, talk with your doctor before you increase the amount of fluids you drink. ?  Avoid or limit alcohol to 2 drinks a day for men and 1 drink a day for women. Alcohol may interfere with your medicine. In addition, alcohol can make your low blood pressure worse by causing your body to lose water. ? Avoid or limit caffeine. Caffeine can cause your body to lose water. ? Wear compression stockings to help improve blood flow. When should you call for help? Call 911 anytime you think you may need emergency care. For example, call if:    · You passed out (lost consciousness). Call your doctor now or seek immediate medical care if:    · You are dizzy or lightheaded, or you feel like you may faint. Watch closely for changes in your health, and be sure to contact your doctor if you have any problems. Where can you learn more? Go to https://CrowdTransfer.Cronote. org and sign in to your NovoPolymers account. Enter C304 in the Akvo box to learn more about \"Low Blood Pressure: Care Instructions. \"     If you do not have an account, please click on the \"Sign Up Now\" link. Current as of: December 16, 2019               Content Version: 12.6  © 4054-2079 [employer]. Care instructions adapted under license by [facility] ([facility]). If you have questions about a medical condition or this instruction, always ask your healthcare professional. [address] any warranty or liability for your use of this information.

## 2020-11-11 NOTE — PROGRESS NOTES
2020    [facility]) Physicians  Internal Medicine  Patient Encounter  Matthew Kahn D.O., [address] -- Audio/Visual (During BQQIJ-71 public health emergency)      I discussed at this telephone/video visit is a nontraditional type of visit that we are conducting in lieu of an office visit to minimize patient risk during the coronavirus pandemic. I discussed with the patient that I would not be able to perform a full physical examination, but that in most other respects the visit would be beneficial to his/her continued medical care. He/She again gave verbal consent for us to conduct this type of visit. Chief Complaint   Patient presents with    Referral - General         HPI:    Paola Grier (:  1945) has requested an audio/video evaluation for the following concern(s): Requested referral.      77 y.o. female being evaluated via virtual video visit due to the coronavirus pandemic emergency and public health crisis and inability to see the patient face-to-face in the office. Patient is requesting referrals to both vascular and podiatry specialists    Pt is C/O spider veins in both legs. She has had sclerotherapy in the past.  She would like to go back to the same specialist, Dr. Melinda Sainz. She is asymptomatic. She denies itching, burning, heavy legs, swelling. She wears compression stockings. Also, she has additional complaints of of an ingrowing left great toenail. She thinks the stockings cause the toes to squeeze together. This Am she noticed her BP was low. She denies lightheadedness. She states she feels great. Metoprolol was added by her EP specialist for PSVT.         Past Medical History:   Diagnosis Date    Abdominal aortic atherosclerosis ([address] 2020    Colon polyps     Hyperlipidemia     Hypertension     Multinodular goiter 2020    Osteopenia     PSVT (paroxysmal supraventricular tachycardia) ([address] 2020 Diagnosed during stress test at [facility] Venous insufficiency of both lower extremities 7/29/2020       Prior to Visit Medications    Medication Sig Taking? Authorizing Provider   aspirin 81 MG EC tablet Take 81 mg by mouth daily Yes Historical Provider, MD   metoprolol succinate (TOPROL XL) 25 MG extended release tablet Take 25 mg by mouth daily Yes Historical Provider, MD   Calcium Carbonate-Vitamin D (CALCIUM 600/VITAMIN D PO) Take 1 tablet by mouth 2 times daily Yes Historical Provider, MD   vitamin B-12 (CYANOCOBALAMIN) 1000 MCG tablet Take 1,000 mcg by mouth daily Yes Historical Provider, MD   Multiple Vitamins-Minerals (ICAPS AREDS 2 PO) Take 1 tablet by mouth 2 times daily Yes Historical Provider, MD   olmesartan-hydroCHLOROthiazide (BENICAR HCT) 20-12.5 MG per tablet Take 1 tablet by mouth daily Yes Sagar Michelle,    atorvastatin (LIPITOR) 10 MG tablet Take 1 tablet by mouth daily Yes аМрина Villagran,          Review of Systems  As per HPI      PHYSICAL EXAMINATION:    Vital Signs: (As obtained by patient/caregiver or practitioner observation)    Patient-Reported Vitals 11/11/2020   Patient-Reported Weight 156   Patient-Reported Height 54   Patient-Reported Systolic 315   Patient-Reported Diastolic 63   Patient-Reported Pulse 57          Wt Readings from Last 3 Encounters:   10/27/20 159 lb (72.1 kg)   07/29/20 161 lb (73 kg)     BP Readings from Last 3 Encounters:   10/27/20 115/60   07/29/20 100/62           Physical Exam  Constitutional:       General: She is not in acute distress. Appearance: Normal appearance. She is not ill-appearing. Eyes:      General: No scleral icterus. Conjunctiva/sclera: Conjunctivae normal.   Neck:      Musculoskeletal: Normal range of motion. Cardiovascular:      Comments: BL LE with spider veins. Pulmonary:      Effort: Pulmonary effort is normal. No respiratory distress. Musculoskeletal:      Right lower leg: No edema.       Left lower leg: No edema. Neurological:      Mental Status: She is alert and oriented to person, place, and time. Due to this being a TeleHealth encounter, evaluation of the following organ systems is limited: Vitals/Constitutional/EENT/Resp/CV/GI//MS/Neuro/Skin/Heme-Lymph-Imm. ASSESSMENT/PLAN:  1. Spider veins of both lower extremities  Refer to Vascular  - Barb - Sharri Murphy MD, Vascular Surgery, [place]    2. Benign essential HTN  BP is lower since starting Beta Blocker for PSVT by her EP specialist  Decrease Olmesartan to 10 mg and keep the HCTZ at 12.5 mg daily. Continue to monitor BP and report  - olmesartan (BENICAR) 5 MG tablet; Take 2 tablets by mouth daily  Dispense: 60 tablet; Refill: 0  - hydroCHLOROthiazide (MICROZIDE) 12.5 MG capsule; Take 1 capsule by mouth every morning  Dispense: 30 capsule; Refill: 0    3. Hypotension, unspecified hypotension type  As above. Push fluids  Decrease Benicar portion of her Benicar HCTZ. Will have to split into two.      4. Ingrown nail of great toe of left foot    - Amb External Referral To Podiatry      Return if symptoms worsen or fail to improve. An  electronic signature was used to authenticate this note. --Abdulaziz Brown,  on 11/11/2020 at 10:55 AM    119}    Pursuant to the emergency declaration under the [facility], [facility] waiver authority and the [employer] Act, this Virtual  Visit was conducted, with patient's consent, to reduce the patient's risk of exposure to COVID-19 and provide continuity of care for an established patient. Services were provided through a video synchronous discussion virtually to substitute for in-person clinic visit.

## 2020-11-12 RX ORDER — HYDROCHLOROTHIAZIDE 12.5 MG/1
CAPSULE, GELATIN COATED ORAL
Qty: 90 CAPSULE | Refills: 1 | OUTPATIENT
Start: 2020-11-12

## 2020-11-27 DIAGNOSIS — E78.5 HYPERLIPIDEMIA, UNSPECIFIED HYPERLIPIDEMIA TYPE: ICD-10-CM

## 2020-11-27 DIAGNOSIS — E53.8 B12 DEFICIENCY: ICD-10-CM

## 2020-11-27 DIAGNOSIS — I70.0 ABDOMINAL AORTIC ATHEROSCLEROSIS (HCC): ICD-10-CM

## 2020-11-27 DIAGNOSIS — I10 BENIGN ESSENTIAL HTN: ICD-10-CM

## 2020-11-27 LAB
A/G RATIO: 2.3 (ref 1.1–2.2)
ALBUMIN SERPL-MCNC: 4.4 G/DL (ref 3.4–5)
ALP BLD-CCNC: 75 U/L (ref 40–129)
ALT SERPL-CCNC: 31 U/L (ref 10–40)
ANION GAP SERPL CALCULATED.3IONS-SCNC: 9 MMOL/L (ref 3–16)
AST SERPL-CCNC: 18 U/L (ref 15–37)
BASOPHILS ABSOLUTE: 0 K/UL (ref 0–0.2)
BASOPHILS RELATIVE PERCENT: 0.7 %
BILIRUB SERPL-MCNC: 0.4 MG/DL (ref 0–1)
BUN BLDV-MCNC: 21 MG/DL (ref 7–20)
CALCIUM SERPL-MCNC: 10 MG/DL (ref 8.3–10.6)
CHLORIDE BLD-SCNC: 107 MMOL/L (ref 99–110)
CHOLESTEROL, TOTAL: 134 MG/DL (ref 0–199)
CO2: 28 MMOL/L (ref 21–32)
CREAT SERPL-MCNC: 0.6 MG/DL (ref 0.6–1.2)
EOSINOPHILS ABSOLUTE: 0.2 K/UL (ref 0–0.6)
EOSINOPHILS RELATIVE PERCENT: 3.5 %
FOLATE: 12.34 NG/ML (ref 4.78–24.2)
GFR AFRICAN AMERICAN: >60
GFR NON-AFRICAN AMERICAN: >60
GLOBULIN: 1.9 G/DL
GLUCOSE BLD-MCNC: 102 MG/DL (ref 70–99)
HCT VFR BLD CALC: 40.9 % (ref 36–48)
HDLC SERPL-MCNC: 42 MG/DL (ref 40–60)
HEMOGLOBIN: 13.7 G/DL (ref 12–16)
LDL CHOLESTEROL CALCULATED: 64 MG/DL
LYMPHOCYTES ABSOLUTE: 1.4 K/UL (ref 1–5.1)
LYMPHOCYTES RELATIVE PERCENT: 25.3 %
MCH RBC QN AUTO: 31 PG (ref 26–34)
MCHC RBC AUTO-ENTMCNC: 33.3 G/DL (ref 31–36)
MCV RBC AUTO: 92.9 FL (ref 80–100)
MONOCYTES ABSOLUTE: 0.6 K/UL (ref 0–1.3)
MONOCYTES RELATIVE PERCENT: 10.4 %
NEUTROPHILS ABSOLUTE: 3.4 K/UL (ref 1.7–7.7)
NEUTROPHILS RELATIVE PERCENT: 60.1 %
PDW BLD-RTO: 12.8 % (ref 12.4–15.4)
PLATELET # BLD: 204 K/UL (ref 135–450)
PMV BLD AUTO: 9.4 FL (ref 5–10.5)
POTASSIUM SERPL-SCNC: 4.9 MMOL/L (ref 3.5–5.1)
RBC # BLD: 4.41 M/UL (ref 4–5.2)
SODIUM BLD-SCNC: 144 MMOL/L (ref 136–145)
TOTAL PROTEIN: 6.3 G/DL (ref 6.4–8.2)
TRIGL SERPL-MCNC: 139 MG/DL (ref 0–150)
VITAMIN B-12: 722 PG/ML (ref 211–911)
VLDLC SERPL CALC-MCNC: 28 MG/DL
WBC # BLD: 5.7 K/UL (ref 4–11)

## 2020-12-07 RX ORDER — HYDROCHLOROTHIAZIDE 12.5 MG/1
CAPSULE, GELATIN COATED ORAL
Qty: 30 CAPSULE | Refills: 2 | Status: SHIPPED | OUTPATIENT
Start: 2020-12-07 | End: 2021-03-08

## 2020-12-07 RX ORDER — OLMESARTAN MEDOXOMIL 5 MG/1
TABLET ORAL
Qty: 60 TABLET | Refills: 2 | Status: SHIPPED | OUTPATIENT
Start: 2020-12-07 | End: 2021-02-17

## 2020-12-30 ENCOUNTER — TELEPHONE (OUTPATIENT)
Dept: INTERNAL MEDICINE CLINIC | Age: 75
End: 2020-12-30

## 2020-12-31 ENCOUNTER — OFFICE VISIT (OUTPATIENT)
Dept: INTERNAL MEDICINE CLINIC | Age: 75
End: 2020-12-31
Payer: MEDICARE

## 2020-12-31 VITALS
SYSTOLIC BLOOD PRESSURE: 110 MMHG | HEART RATE: 70 BPM | TEMPERATURE: 96 F | DIASTOLIC BLOOD PRESSURE: 66 MMHG | WEIGHT: 158 LBS | BODY MASS INDEX: 27.12 KG/M2 | RESPIRATION RATE: 16 BRPM

## 2020-12-31 PROCEDURE — 1123F ACP DISCUSS/DSCN MKR DOCD: CPT | Performed by: INTERNAL MEDICINE

## 2020-12-31 PROCEDURE — 1090F PRES/ABSN URINE INCON ASSESS: CPT | Performed by: INTERNAL MEDICINE

## 2020-12-31 PROCEDURE — 1036F TOBACCO NON-USER: CPT | Performed by: INTERNAL MEDICINE

## 2020-12-31 PROCEDURE — G8417 CALC BMI ABV UP PARAM F/U: HCPCS | Performed by: INTERNAL MEDICINE

## 2020-12-31 PROCEDURE — 3017F COLORECTAL CA SCREEN DOC REV: CPT | Performed by: INTERNAL MEDICINE

## 2020-12-31 PROCEDURE — G8427 DOCREV CUR MEDS BY ELIG CLIN: HCPCS | Performed by: INTERNAL MEDICINE

## 2020-12-31 PROCEDURE — G8484 FLU IMMUNIZE NO ADMIN: HCPCS | Performed by: INTERNAL MEDICINE

## 2020-12-31 PROCEDURE — 99213 OFFICE O/P EST LOW 20 MIN: CPT | Performed by: INTERNAL MEDICINE

## 2020-12-31 PROCEDURE — 4040F PNEUMOC VAC/ADMIN/RCVD: CPT | Performed by: INTERNAL MEDICINE

## 2020-12-31 PROCEDURE — G8400 PT W/DXA NO RESULTS DOC: HCPCS | Performed by: INTERNAL MEDICINE

## 2020-12-31 RX ORDER — METOPROLOL SUCCINATE 25 MG/1
12.5 TABLET, EXTENDED RELEASE ORAL DAILY
COMMUNITY
End: 2021-09-28 | Stop reason: ALTCHOICE

## 2020-12-31 NOTE — PROGRESS NOTES
[facility]) Physicians  Internal Medicine  Patient Encounter  Vijay Ellis D.O., [facility]        Chief Complaint   Patient presents with    Other     Trunk lid fell hitting bridge of nose yesterday. HPI: 77 y.o. female seen urgently today after being struck on the bridge of her nose by her trunk. The injury occurred yesterday. She had sent an email yesterday indicating that she had swelling and bruising. Patient states that her glasses dug into her nose. The bruising and swelling have improved. She did not lose consciousness. He denies any visual disturbances. She denies any nausea or vomiting. She also reported in her email that she had a swollen area on the left side of her neck between her clavicle and ear. She points to the supraclavicular area. She states she noticed this fullness over the last 3 days. She also states that there is a small nodule that she can roll under her fingers. She denies any fever, chills, sweats. She denies any appetite problems or weight loss. Pt had a thyroid US at the [facility] which showed a multinodular goiter.        Past Medical History:   Diagnosis Date    Abdominal aortic atherosclerosis ([facility] 7/29/2020    Colon polyps     Hyperlipidemia     Hypertension     Multinodular goiter 7/29/2020    Osteopenia     PSVT (paroxysmal supraventricular tachycardia) ([facility] 07/24/2020    Diagnosed during stress test at [facility] Venous insufficiency of both lower extremities 7/29/2020         MEDICATIONS:  Medication Sig   metoprolol succinate (TOPROL XL) 25 MG extended release tablet Take 12.5 mg by mouth daily   hydroCHLOROthiazide (MICROZIDE) 12.5 MG capsule TAKE 1 CAPSULE BY MOUTH EVERY DAY IN THE MORNING   olmesartan (BENICAR) 5 MG tablet TAKE 2 TABLETS BY MOUTH EVERY DAY   aspirin 81 MG EC tablet Take 81 mg by mouth daily   Calcium Carbonate-Vitamin D (CALCIUM 600/VITAMIN D PO) Take 1 tablet by mouth 2 times daily   vitamin B-12 (CYANOCOBALAMIN) 1000 MCG tablet Take 1,000 mcg by mouth daily   Multiple Vitamins-Minerals (ICAPS AREDS 2 PO) Take 1 tablet by mouth 2 times daily   atorvastatin (LIPITOR) 10 MG tablet Take 1 tablet by mouth daily   olmesartan-hydroCHLOROthiazide (BENICAR HCT) 20-12.5 MG per tablet Take 1 tablet by mouth daily  Patient not taking: Reported on 12/31/2020           Review of Systems - As per HPI      OBJECTIVE:  /66   Pulse 70   Temp 96 °F (35.6 °C) (Temporal)   Resp 16   Wt 158 lb (71.7 kg)   BMI 27.12 kg/m²   GEN: NAD, A&O, Non-toxic  HEENT: NC/AT, KATE, EOMI, Oral cavity Clear,  TM's NL, Nasal cavity clear. NECK: Supple. No thyromegaly. No JVD. Left supraclavicular fossa fullness. There is also a small subcutaneous nodular lesion measuring about 1 cm. This lesion is mobile and nontender. LYMPH: No C/SC nodes. CV: S1 S2 NL, RRR. No murmurs, clicks or rubs. PULM: CTA, symmentric air exchange  EXT: No edema  NEURO: No focal or lateralizing deficits. SKIN: Tiny linear skin abrasion at the bridge of her nose. Minimal tenderness with palpation of the bridge of the nose. No ecchymosis visible at this time. ASSESSMENT[de-identified]  Sarai Austin was seen today for other. Diagnoses and all orders for this visit:    Contusion of nose, initial encounter    Supraclavicular mass  Comments:  Prominence of left supraclavicular fossa  Orders:  -     Clara Shelton MD, Otolaryngology, [facility]  -     CT SOFT TISSUE NECK W CONTRAST; Future    Multiple thyroid nodules  Comments:  [facility]  Orders:  -     Clara Shelton MD, Otolaryngology, [facility]  -     CT SOFT TISSUE NECK W CONTRAST; Future    Supraclavicular fossa fullness  -     CT SOFT TISSUE NECK W CONTRAST; Future          Discussed medications with patient who voiced understanding of their use, indication and potential side effects. Pt also understands the above recommendations. All questions answered.     This note was generated completely or in part utilizing Dragon dictation speech recognition software. Occasionally, words are mistranscribed and despite editing, the text may contain inaccuracies due to incorrect word recognition.   If further clarification is needed please contact the office at [phone]       Electronically signed    Izzy Brambila D.O.

## 2020-12-31 NOTE — PATIENT INSTRUCTIONS
Patient Education        Bruised Nose: Care Instructions  Your Care Instructions     You can get a bruised nose if you fall or if something hits your nose. The medical term for a bruise is \"contusion. \" Small blood vessels get torn and leak blood under the skin. Most people think of a bruise as a black-and-blue spot. But bones and muscles can also get bruised. This may damage deep tissues but not cause a bruise you can see. Your doctor will examine you and will gently press on your nose and face to find areas that are tender. He or she will check your eyes, how well you can move the muscles near the bruise, and your feeling around the area to make sure there isn't a more serious injury, such as a broken bone or nerve damage. You may have tests, including X-rays or other imaging tests like a CT scan. Sometimes it can be hard to tell if a nose is broken or just bruised. The symptoms may be the same. And a broken bone can't always be seen on an X-ray. But the treatment for a bruised nose is often the same as for a broken nose. A bruised nose may cause pain and swelling of the nose and face. But if there is no other damage, it will usually get better in a few weeks with home treatment. Follow-up care is a key part of your treatment and safety. Be sure to make and go to all appointments, and call your doctor if you are having problems. It's also a good idea to know your test results and keep a list of the medicines you take. How can you care for yourself at home? · Put ice or a cold pack on your nose for 10 to 20 minutes at a time. Put a thin cloth between the ice pack and your skin. Try to do this every 1 to 2 hours for the first 3 days (when you are awake) or until the swelling goes down. · Sleep with your head slightly raised until the swelling goes down. Prop up your head and shoulders on pillows. · If you play contact sports, ask your doctor when it's okay to play again. It's safest to wait at least 6 weeks. · Be safe with medicines. Read and follow all instructions on the label. ? If the doctor gave you a prescription medicine for pain, take it as prescribed. ? If you are not taking a prescription pain medicine, ask your doctor if you can take an over-the-counter medicine. When should you call for help? Call your doctor now or seek immediate medical care if:    · Your pain gets worse.     · You have new or worse swelling.     · You have new or worse bleeding.     · The area near the bruise is tingly, weak, or numb.     · You have vision changes.     · You have clear fluid draining from your nose. Watch closely for changes in your health, and be sure to contact your doctor if:    · You do not get better as expected. Where can you learn more? Go to https://ActiveCloud.Lovestruck.com. org and sign in to your StudySoup account. Enter U082 in the [employer] box to learn more about \"Bruised Nose: Care Instructions. \"     If you do not have an account, please click on the \"Sign Up Now\" link. Current as of: June 26, 2019               Content Version: 12.6  © 7258-7406 [employer]. Care instructions adapted under license by [facility] ([facility]). If you have questions about a medical condition or this instruction, always ask your healthcare professional. [place] [address] any warranty or liability for your use of this information. Patient Education        Goiter: Care Instructions  Your Care Instructions     A goiter is an enlarged thyroid gland. It can cause swelling in your neck. Your thyroid is found in the front of your neck. It makes a hormone that controls how your body uses energy. Goiters are caused by different things. Some are caused by high or low levels of thyroid hormone. Others are caused by too little iodine in the diet, or a growth or disease in the thyroid. In the [place], most goiters are caused by long-term autoimmune thyroiditis. This is also called Hashimoto's thyroiditis. It happens when the body's immune system damages the thyroid. You may take thyroid hormone to reduce the size of your goiter. Or you may need surgery or radioactive iodine treatment. Some people don't need any treatment. They only need to watch for changes in the goiter. Follow-up care is a key part of your treatment and safety. Be sure to make and go to all appointments, and call your doctor if you are having problems. It's also a good idea to know your test results and keep a list of the medicines you take. How can you care for yourself at home? · Be safe with medicines. Take your medicines exactly as prescribed. Call your doctor if you think you are having a problem with your medicine. You will get more details on the specific medicines your doctor prescribes. When should you call for help? Call 911 anytime you think you may need emergency care. For example, call if:    · You have trouble breathing. Watch closely for changes in your health, and be sure to contact your doctor if:    · Your eyes turn red and bulge.     · You have trouble swallowing.     · You feel very tired or weak.     · You lose weight but are eating the same or more than usual.   Where can you learn more? Go to https://CureVacmerritt.WorkFusion (previously CrowdComputing Systems). org and sign in to your [employer] account. Enter S307 in the [facility] box to learn more about \"Goiter: Care Instructions. \"     If you do not have an account, please click on the \"Sign Up Now\" link. Current as of: March 31, 2020               Content Version: 12.6  © 3021-4279 [employer]. Care instructions adapted under license by [facility] ([facility]). If you have questions about a medical condition or this instruction, always ask your healthcare professional. [address] any warranty or liability for your use of this information. Patient Education        Thyroid Nodules: Care Instructions  Your Care Instructions  Thyroid nodules are growths or lumps in the thyroid gland. Your thyroid is in the front of your neck. It controls how your body uses energy. You may have tests to see if the nodule is caused by cancer. Most nodules aren't cancer and don't cause problems. Many don't even need treatment. If you do have cancer, it can usually be cured. Treatment will probably include surgery. You may also get radioactive iodine treatment. If your thyroid can't make thyroid hormone after treatment, you can take a pill every day to replace the hormone. Follow-up care is a key part of your treatment and safety. Be sure to make and go to all appointments, and call your doctor if you are having problems. It's also a good idea to know your test results and keep a list of the medicines you take. How can you care for yourself at home? · Be safe with medicines. If you take thyroid hormone medicine:  ? Take it exactly as prescribed. Call your doctor if you think you are having a problem with your medicine. If you take the right amount and don't skip doses, you probably won't have side effects. ? Tell your doctor about any medicines you take. This includes over-the-counter medicines. When should you call for help? Call 911 anytime you think you may need emergency care. For example, call if:    · You lose consciousness. Call your doctor now or seek immediate medical care if:    · You have shortness of breath. Watch closely for changes in your health, and be sure to contact your doctor if:    · You have pain in your neck, jaw, or ear.     · You have problems swallowing.   · You feel weak and tired.     · You have nervousness, a fast heartbeat, hand tremors, problems sleeping, increased sweating, and weight loss.     · You do not feel better even though you are taking your medicine. Where can you learn more? Go to https://chrustyeb.[employer] Services. org and sign in to your [employer] account. Enter G890 in the [employer] box to learn more about \"Thyroid Nodules: Care Instructions. \"     If you do not have an account, please click on the \"Sign Up Now\" link. Current as of: March 31, 2020               Content Version: 12.6  © 3674-9379 [employer]. Care instructions adapted under license by [facility] ([facility]). If you have questions about a medical condition or this instruction, always ask your healthcare professional. [address] any warranty or liability for your use of this information.

## 2021-01-05 ENCOUNTER — OFFICE VISIT (OUTPATIENT)
Dept: ENT CLINIC | Age: 76
End: 2021-01-05
Payer: MEDICARE

## 2021-01-05 VITALS
SYSTOLIC BLOOD PRESSURE: 114 MMHG | HEART RATE: 57 BPM | DIASTOLIC BLOOD PRESSURE: 63 MMHG | HEIGHT: 65 IN | WEIGHT: 159.2 LBS | TEMPERATURE: 97.2 F | BODY MASS INDEX: 26.52 KG/M2

## 2021-01-05 DIAGNOSIS — E04.2 MULTIPLE THYROID NODULES: ICD-10-CM

## 2021-01-05 DIAGNOSIS — R22.1 NECK SWELLING: Primary | ICD-10-CM

## 2021-01-05 PROCEDURE — G8427 DOCREV CUR MEDS BY ELIG CLIN: HCPCS | Performed by: OTOLARYNGOLOGY

## 2021-01-05 PROCEDURE — G8417 CALC BMI ABV UP PARAM F/U: HCPCS | Performed by: OTOLARYNGOLOGY

## 2021-01-05 PROCEDURE — 4040F PNEUMOC VAC/ADMIN/RCVD: CPT | Performed by: OTOLARYNGOLOGY

## 2021-01-05 PROCEDURE — G8400 PT W/DXA NO RESULTS DOC: HCPCS | Performed by: OTOLARYNGOLOGY

## 2021-01-05 PROCEDURE — 1036F TOBACCO NON-USER: CPT | Performed by: OTOLARYNGOLOGY

## 2021-01-05 PROCEDURE — G8484 FLU IMMUNIZE NO ADMIN: HCPCS | Performed by: OTOLARYNGOLOGY

## 2021-01-05 PROCEDURE — 1123F ACP DISCUSS/DSCN MKR DOCD: CPT | Performed by: OTOLARYNGOLOGY

## 2021-01-05 PROCEDURE — 10005 FNA BX W/US GDN 1ST LES: CPT | Performed by: OTOLARYNGOLOGY

## 2021-01-05 PROCEDURE — 1090F PRES/ABSN URINE INCON ASSESS: CPT | Performed by: OTOLARYNGOLOGY

## 2021-01-05 PROCEDURE — 3017F COLORECTAL CA SCREEN DOC REV: CPT | Performed by: OTOLARYNGOLOGY

## 2021-01-05 PROCEDURE — 99204 OFFICE O/P NEW MOD 45 MIN: CPT | Performed by: OTOLARYNGOLOGY

## 2021-01-05 ASSESSMENT — ENCOUNTER SYMPTOMS
CONSTIPATION: 0
VOICE CHANGE: 0
COLOR CHANGE: 0
BACK PAIN: 0
CHEST TIGHTNESS: 0
FACIAL SWELLING: 0
SINUS PRESSURE: 0
TROUBLE SWALLOWING: 0
EYE DISCHARGE: 0
CHOKING: 0
VOMITING: 0
BLOOD IN STOOL: 0
RHINORRHEA: 0
COUGH: 0
APNEA: 0
EYE PAIN: 0
SHORTNESS OF BREATH: 0
NAUSEA: 0
WHEEZING: 0
EYE ITCHING: 0
DIARRHEA: 0
SORE THROAT: 0
STRIDOR: 0
EYE REDNESS: 0
SINUS PAIN: 0

## 2021-01-05 NOTE — PROGRESS NOTES
Subjective:      Patient ID: Lien Deal is a 77 y.o. female. HPI  Chief Complaint   Patient presents with    neck swellig       History of Present Illness  Head/Neck    Jonathan Romero is a(n) 77 y.o. female who presents with a 2 week history of left neck swelling. Non painful. Resolved on own. Also with history of goiter. No FNA. Diagnosed at [facility].    Pain: No  Location: neck  Onset: As above  Timing: waxing and waning  Otalgia: No  Odynophagia: No  Dysphagia: No  Dyspnea: No  Voice Changes: No  Lumps in neck: Yes  Hemoptysis: No  Fever: No  Chills: No  Sweats: No  Lethargy: No  Weight Loss: No  Modifying Factors: Non smoker  Associates Signs and Symptoms: none    Patient Active Problem List   Diagnosis    Venous insufficiency of both lower extremities    Polyp of colon    Abdominal aortic atherosclerosis (HCC)    Hyperlipidemia    Benign essential HTN    Multinodular goiter    Osteopenia    PSVT (paroxysmal supraventricular tachycardia) (HCC)    Vitamin D deficiency     Past Surgical History:   Procedure Laterality Date    BREAST LUMPECTOMY Right 1970    CATARACT REMOVAL WITH IMPLANT Bilateral 2012    Dr. Alen Irene  2018   [address] ADENOIDECTOMY      11 yo     Family History   Problem Relation Age of Onset    Heart Disease Mother         CHF    Other Mother         PVD    Stroke Father 80    Coronary Art Dis Father         CHF    Atrial Fibrillation Sister     Hypertension Brother     High Cholesterol Brother     Breast Cancer Daughter 52        Stage zero, Double Mastectomy     Social History     Socioeconomic History    Marital status:      Spouse name: Not on file    Number of children: Not on file    Years of education: Not on file    Highest education level: Not on file   Occupational History    Not on file   Social Needs    Financial resource strain: Not on file    Food insecurity     Worry: Not on file     Inability: Not on file  Transportation needs     Medical: Not on file     Non-medical: Not on file   Tobacco Use    Smoking status: Never Smoker    Smokeless tobacco: Never Used   Substance and Sexual Activity    Alcohol use: Never     Frequency: Never    Drug use: Never    Sexual activity: Yes   Lifestyle    Physical activity     Days per week: Not on file     Minutes per session: Not on file    Stress: Not on file   Relationships    Social connections     Talks on phone: Not on file     Gets together: Not on file     Attends Religion service: Not on file     Active member of club or organization: Not on file     Attends meetings of clubs or organizations: Not on file     Relationship status: Not on file    Intimate partner violence     Fear of current or ex partner: Not on file     Emotionally abused: Not on file     Physically abused: Not on file     Forced sexual activity: Not on file   Other Topics Concern    Not on file   Social History Narrative    Not on file       DRUG/FOOD ALLERGIES: Latex and Dust mite extract    CURRENT MEDICATIONS  Prior to Admission medications    Medication Sig Start Date End Date Taking?  Authorizing Provider   metoprolol succinate (TOPROL XL) 25 MG extended release tablet Take 12.5 mg by mouth daily   Yes Historical Provider, MD   hydroCHLOROthiazide (MICROZIDE) 12.5 MG capsule TAKE 1 CAPSULE BY MOUTH EVERY DAY IN THE MORNING 12/7/20  Yes Sagar Michelle, DO   olmesartan (BENICAR) 5 MG tablet TAKE 2 TABLETS BY MOUTH EVERY DAY 12/7/20  Yes Sagar Michelle DO   aspirin 81 MG EC tablet Take 81 mg by mouth daily   Yes Historical Provider, MD   Calcium Carbonate-Vitamin D (CALCIUM 600/VITAMIN D PO) Take 1 tablet by mouth 2 times daily   Yes Historical Provider, MD   vitamin B-12 (CYANOCOBALAMIN) 1000 MCG tablet Take 1,000 mcg by mouth daily   Yes Historical Provider, MD   Multiple Vitamins-Minerals (ICAPS AREDS 2 PO) Take 1 tablet by mouth 2 times daily   Yes Historical Provider, MD atorvastatin (LIPITOR) 10 MG tablet Take 1 tablet by mouth daily 10/23/20  Yes [address] [facility],          Review of Systems   Constitutional: Negative for activity change, appetite change, chills, fatigue and fever. HENT: Negative for congestion, dental problem, drooling, ear discharge, ear pain, facial swelling, hearing loss, mouth sores, nosebleeds, postnasal drip, rhinorrhea, sinus pressure, sinus pain, sneezing, sore throat, tinnitus, trouble swallowing and voice change. Eyes: Negative for pain, discharge, redness, itching and visual disturbance. Respiratory: Negative for apnea, cough, choking, chest tightness, shortness of breath, wheezing and stridor. Cardiovascular: Negative for palpitations. Gastrointestinal: Negative for blood in stool, constipation, diarrhea, nausea and vomiting. Endocrine: Negative for cold intolerance, heat intolerance, polydipsia, polyphagia and polyuria. Musculoskeletal: Negative for back pain, gait problem, neck pain and neck stiffness. Skin: Negative for color change, pallor, rash and wound. Allergic/Immunologic: Negative for environmental allergies, food allergies and immunocompromised state. Neurological: Negative for dizziness, facial asymmetry, speech difficulty, light-headedness, numbness and headaches. Hematological: Negative for adenopathy. Does not bruise/bleed easily. Psychiatric/Behavioral: Negative for agitation, confusion, self-injury and sleep disturbance. The patient is not nervous/anxious. Objective:   Physical Exam  Constitutional:       Appearance: She is well-developed. She is not ill-appearing. HENT:      Head: Normocephalic and atraumatic. Not macrocephalic and not microcephalic. No raccoon eyes, Tristan's sign, abrasion, contusion, right periorbital erythema, left periorbital erythema or laceration. Hair is normal.      Jaw: No trismus. Salivary Glands: Right salivary gland is not diffusely enlarged. Left salivary gland is not diffusely enlarged. Right Ear: Hearing, tympanic membrane and external ear normal. No decreased hearing noted. No drainage, swelling or tenderness. No middle ear effusion. No mastoid tenderness. Tympanic membrane is not perforated, retracted or bulging. Tympanic membrane has normal mobility. Left Ear: Hearing, tympanic membrane and external ear normal. No decreased hearing noted. No drainage, swelling or tenderness. No middle ear effusion. No mastoid tenderness. Tympanic membrane is not perforated, retracted or bulging. Tympanic membrane has normal mobility. Ears:      Carrillo exam findings: does not lateralize. Right Rinne: AC > BC. Left Rinne: AC > BC. Nose: No nasal deformity, septal deviation, laceration, mucosal edema or rhinorrhea. Right Nostril: No epistaxis. Left Nostril: No epistaxis. Right Turbinates: Not enlarged. Left Turbinates: Not enlarged. Right Sinus: No maxillary sinus tenderness or frontal sinus tenderness. Left Sinus: No maxillary sinus tenderness or frontal sinus tenderness. Mouth/Throat:      Lips: No lesions. Mouth: Mucous membranes are not pale, not dry and not cyanotic. No lacerations or oral lesions. Dentition: Normal dentition. No dental caries or dental abscesses. Tongue: No lesions. Palate: No mass. Pharynx: Uvula midline. No oropharyngeal exudate, posterior oropharyngeal erythema or uvula swelling. Tonsils: No tonsillar abscesses. Eyes:      General: Lids are normal. Lids are everted, no foreign bodies appreciated. Right eye: No discharge. Left eye: No discharge. Extraocular Movements:      Right eye: Normal extraocular motion and no nystagmus. Left eye: Normal extraocular motion and no nystagmus. Conjunctiva/sclera:      Right eye: No chemosis or exudate. Left eye: No chemosis or exudate. Neck:      Musculoskeletal: Neck supple. Thyroid: No thyroid mass or thyromegaly. Vascular: Normal carotid pulses. Trachea: Trachea normal. No tracheal deviation. Cardiovascular:      Rate and Rhythm: Normal rate and regular rhythm. Pulmonary:      Effort: No tachypnea, bradypnea or respiratory distress. Breath sounds: No stridor. Musculoskeletal:      Right shoulder: She exhibits normal range of motion. Left shoulder: She exhibits normal range of motion. Lymphadenopathy:      Head:      Right side of head: No submental, submandibular, tonsillar, preauricular, posterior auricular or occipital adenopathy. Left side of head: No submental, submandibular, tonsillar, preauricular, posterior auricular or occipital adenopathy. Cervical:      Right cervical: No superficial, deep or posterior cervical adenopathy. Left cervical: No superficial, deep or posterior cervical adenopathy. Skin:     Findings: No bruising, erythema, laceration or lesion. Neurological:      Mental Status: She is alert and oriented to person, place, and time. Psychiatric:         Speech: Speech normal.         Behavior: Behavior normal.       NECK ULTRASOUND    Pre-op Diagnosis: right dominant thyroid nodule  Anesthesia: None  Complications: none  Estimated Blood Loss: none  Indications: diagnostic  Procedure: neck US    The patient was placed in a semi-recumbent position with mild neck extension. Real time B-mode ultrasound on the neck was performed in transverse/ axial and longitudinal/ sagittal planes. Findings:   Left lobe: 4.1x3. 2x3.0  Right Lobe: 3.2x3. 4x4.2  Isthmus: 4mm    Nodules/Cysts: 2.37 dominant nodule right mid thyroid isoechoic with calcifications. Ultrasound guided fine needle aspiration was performed on multiple passes. The patient tolerated the procedure well and without side effects. I attest that I was present for and did the entire procedure myself. Assessment:       Diagnosis Orders   1. Neck swelling     2. Multiple thyroid nodules             Plan:      No evidence of left neck pathology. Call if swells again. Right dominant thyroid nodule FNA performed. Call with results and plans. Medical Decision Making:   The following items were considered in medical decision making:  Independent review of images  Review / order clinical lab tests  Review / order radiology tests  Decision to obtain old records  Review and summation of old records as accessed through [facility] (a summary of my findings in these old records: [facility] clinic notes and ultrasound. )           Jeramie Negron MD

## 2021-01-08 ENCOUNTER — OFFICE VISIT (OUTPATIENT)
Dept: DERMATOLOGY | Age: 76
End: 2021-01-08
Payer: MEDICARE

## 2021-01-08 DIAGNOSIS — L72.0 MILIA: ICD-10-CM

## 2021-01-08 DIAGNOSIS — L82.1 SEBORRHEIC KERATOSIS: ICD-10-CM

## 2021-01-08 DIAGNOSIS — D18.01 CHERRY ANGIOMA: ICD-10-CM

## 2021-01-08 DIAGNOSIS — D22.9 MULTIPLE BENIGN MELANOCYTIC NEVI: ICD-10-CM

## 2021-01-08 DIAGNOSIS — L85.3 XEROSIS CUTIS: ICD-10-CM

## 2021-01-08 DIAGNOSIS — L72.0 EPIDERMAL INCLUSION CYST: ICD-10-CM

## 2021-01-08 DIAGNOSIS — L57.8 ACTINODERMATOSIS: ICD-10-CM

## 2021-01-08 DIAGNOSIS — D23.71 DERMATOFIBROMA OF RIGHT THIGH: ICD-10-CM

## 2021-01-08 DIAGNOSIS — L71.8 ACNE ROSACEA, ERYTHEMATOUS TELANGIECTATIC TYPE: ICD-10-CM

## 2021-01-08 DIAGNOSIS — L57.3 POIKILODERMA OF CIVATTE: ICD-10-CM

## 2021-01-08 DIAGNOSIS — L57.0 ACTINIC KERATOSIS: Primary | ICD-10-CM

## 2021-01-08 PROCEDURE — 99204 OFFICE O/P NEW MOD 45 MIN: CPT | Performed by: DERMATOLOGY

## 2021-01-08 PROCEDURE — 1123F ACP DISCUSS/DSCN MKR DOCD: CPT | Performed by: DERMATOLOGY

## 2021-01-08 PROCEDURE — 1090F PRES/ABSN URINE INCON ASSESS: CPT | Performed by: DERMATOLOGY

## 2021-01-08 PROCEDURE — 4040F PNEUMOC VAC/ADMIN/RCVD: CPT | Performed by: DERMATOLOGY

## 2021-01-08 PROCEDURE — 1036F TOBACCO NON-USER: CPT | Performed by: DERMATOLOGY

## 2021-01-08 PROCEDURE — 3017F COLORECTAL CA SCREEN DOC REV: CPT | Performed by: DERMATOLOGY

## 2021-01-08 PROCEDURE — G8484 FLU IMMUNIZE NO ADMIN: HCPCS | Performed by: DERMATOLOGY

## 2021-01-08 PROCEDURE — G8400 PT W/DXA NO RESULTS DOC: HCPCS | Performed by: DERMATOLOGY

## 2021-01-08 PROCEDURE — G8428 CUR MEDS NOT DOCUMENT: HCPCS | Performed by: DERMATOLOGY

## 2021-01-08 PROCEDURE — 17000 DESTRUCT PREMALG LESION: CPT | Performed by: DERMATOLOGY

## 2021-01-08 PROCEDURE — G8417 CALC BMI ABV UP PARAM F/U: HCPCS | Performed by: DERMATOLOGY

## 2021-01-08 NOTE — PATIENT INSTRUCTIONS
Dry Skin Care    Bath Temperature:  Use lukewarm water; hot water dries the skin, and although it may feel good for a few minutes, it will itch even more later on. Length of Bath:  A short shower is much better than a long one. Long showers or baths wash off the natural protective oils of the skin. A lukewarm tub-bath is OK, particularly if you like to use bath oil in the water. Always remember that a quick, cool shower using no soap is preferable to a long, hot soapy one. Cleansing:  Soaps and detergents remove the natural protective oils, leaving the skin dry and irritated. If cleansing is really necessary, soaps such as vanicream, Cetaphil, Dove, or Purpose are acceptable. Body washes with petrolatum, or oils such as soybean oil, sunflower seed oil are also okay (Dove, Eucerin, etc. have these). However, clear water will remove dust and sweat and grime and soaps and washes are needed only in the odor bearing areas (underarms, groin, feet). It is important to rinse these areas very well to remove all traces of soap. Avoid excessive scrubbing of the outer arms, legs, and back, and use your hands to bathenot a washcloth, loofah, or sponge. Moisturizing:  Bath or mineral oils can be added to the water or applied directly to the wet skin after the shower or tub bath. Avoid oils with strong perfumes that irritate your skin. Thicker creams like Vanicream, Cetaphil and Eucerin, or ointments like Aquaphor and white petroleum jelly (Vaseline) are better. They may be greasier, but are more effective. Put them on immediately after bathing and patting dry. Additional Suggestions and Summary:    ? Do not take bubble baths     ? Do not allow the blower from a furnace or car heater to blow on your skin    ?  Avoid using alcohol, astringents, calamine lotion, hydrogen peroxide, powders or deodorant sprays as these will dry out your skin ? Avoid hot tubs. Hot chlorinated water will strip the natural oils from your skin    ? Use soap less often, and try a mild fragrance-free soap. ? Use moisturizer during the day as often as possible. ? Within 3 minutes after bathing, apply the moisturizer to your entire body to trap the moisture in your skin. ? Avoid long, hot showers because hot water removes oils from your skin more quickly. ? Ointments (clear, thick, greasy) and creams (white, thick, in a jar) work better as moisturizers than lotions (white, thin, easier to spread). ? Ointment examples: Aquaphor ointment, Vaseline (petrolatum, petroleum jelly). ? Cream examples (creams are in jars):  Vanicream, CeraVe, Cetaphil,  Eucerin, Mild soap examples: Vanicream, Dove sensitive, Cetaphil, CeraVe    Cryosurgery (Liquid Nitrogen Treatment): Aftercare instructions    Cryosurgery is the use of a cold, liquefied gas to remove superficial skin growths such as warts and keratoses. This liquefied gas is cooled to around 300? below 0? F. When it comes in contact with a skin growth, the growth is frozen and should fall away or shrink. What to expect:  After treatment, your skin will become red and swollen and may develop a blister or scab. Healing takes 1 to 3 weeks, after which the skin may look perfectly normal or may be slightly lighter in color. Sometimes the growth will not disappear completely and multiple treatments may be needed. This is especially true for warts. Proper care after the procedure:    Clean the treated area with soap and water as you normally would. It is fine to get the area wet; however, be sure to blot it dry carefully and delicately.  If necessary, you can open the blister with a sterilized needle.  If a scab or small wound forms, apply a thin layer of Vaseline/Aquaphor ointment to the site twice daily.  If the site is prone to irritation, cover it with a bandage.  You will likely see a crust form after a few days. This crust will fall off on its own. A-B-C-D-E guide for Melanoma     Characteristics of unusual moles that may indicate melanomas or other skin cancers follow the A-B-C-D-E guide developed by the [employer]:  1. A is for asymmetrical shape. Look for moles with irregular shapes, such as two very different-looking halves. 2. B is for irregular border. Look for moles with irregular, notched or scalloped borders, these may be characteristics of melanomas. 3. C is for changes in color. Look for growths that have many colors or an uneven distribution of color. 4. D is for diameter. Look for new growth in a mole larger than about 1/4 inch (6 millimeters) or about the size of a pencil eraser. 5. E is for evolving. This is the most important one. Look for changes over time, such as a mole that grows in size or that changes color or shape. Kayce Pettit may also evolve to develop new signs and symptoms, such as new itchiness or bleeding. If any of your moles appear to be changing, see your doctor. Other suspicious changes in a mole may include: Scaly skin, itching, spreading of color from the mole into the surrounding skin, oozing or bleeding. Cancerous (malignant) moles vary greatly in appearance. Some may show all of the changes listed above, while others may have only one or two unusual characteristics. Please call and schedule an appointment if you have any concerns or questions. Skin Cancer Prevention: After Your Visit    Skin cancer is the abnormal growth of cells in the skin. It usually appears as a growth that changes in color, shape, or size. This can be a sore that does not heal or a change in a wart or a mole. Skin cancer is almost always curable when found early and treated. So it is important to see your doctor if you have any of these changes in your skin. Skin cancer is the most common type of cancer. It often appears on areas of the body that have been exposed to the sun, such as the head, face, neck, back, chest, or shoulders. Follow-up care is a key part of your treatment and safety. Be sure to make and go to all appointments, and call your doctor if you are having problems. It's also a good idea to know your test results and keep a list of the medicines you take. How can you care for yourself at home?  - Wear a wide-brimmed hat and long sleeves and pants if you are going to be outdoors for a long time. - Avoid the sun between 10 a.m. and 4 p.m., which is the peak time for UV rays. - Wear sunscreen on exposed skin. Make sure the sunscreen blocks ultraviolet rays (both UVA and UVB) and has a sun protection factor (SPF) of at least 30. Use it every day, even when it is cloudy. Some doctors may recommend a higher SPF, such as 48.  - Do not use tanning booths or sunlamps. - Use lip balm or cream that has sun protection factor (SPF) to protect your lips from getting sunburned or getting cold sores. - Wear sunglasses that block UV rays. When should you call for help? Watch closely for changes in your health, and be sure to contact your doctor if:  - You are concerned about any problem areas on your skin. - You notice a change in a mole or skin growth. For example:  a. It gets bigger. b. It develops uneven borders. c. It gets thicker, raised, or worn down. d. It changes color. e. It starts to bleed easily.

## 2021-01-08 NOTE — PROGRESS NOTES
 Rash     Tinnitus     Venous insufficiency of both lower extremities 7/29/2020       Past Surgical History:  Past Surgical History:   Procedure Laterality Date    BREAST LUMPECTOMY Right 1970    CATARACT REMOVAL WITH IMPLANT Bilateral 2012    Dr. Alen Irene  2018    TONSILLECTOMY AND ADENOIDECTOMY      9 yo       Past Family History:  Family History   Problem Relation Age of Onset    Heart Disease Mother         CHF    Other Mother         PVD    Stroke Father 80    Coronary Art Dis Father         CHF    Atrial Fibrillation Sister     Hypertension Brother     High Cholesterol Brother     Breast Cancer Daughter 52        Stage zero, Double Mastectomy       Allergies: Allergies   Allergen Reactions    Latex Itching    Dust Mite Extract        Current Medications:  Current Outpatient Medications   Medication Sig Dispense Refill    metoprolol succinate (TOPROL XL) 25 MG extended release tablet Take 12.5 mg by mouth daily      hydroCHLOROthiazide (MICROZIDE) 12.5 MG capsule TAKE 1 CAPSULE BY MOUTH EVERY DAY IN THE MORNING 30 capsule 2    olmesartan (BENICAR) 5 MG tablet TAKE 2 TABLETS BY MOUTH EVERY DAY 60 tablet 2    aspirin 81 MG EC tablet Take 81 mg by mouth daily      Calcium Carbonate-Vitamin D (CALCIUM 600/VITAMIN D PO) Take 1 tablet by mouth 2 times daily      vitamin B-12 (CYANOCOBALAMIN) 1000 MCG tablet Take 1,000 mcg by mouth daily      Multiple Vitamins-Minerals (ICAPS AREDS 2 PO) Take 1 tablet by mouth 2 times daily      atorvastatin (LIPITOR) 10 MG tablet Take 1 tablet by mouth daily 90 tablet 3     No current facility-administered medications for this visit. Review of Systems:  Constitutional: No fevers, chills or recent illness.    Skin: Skin:As per HPI AND otherwise no new, bleeding or symptomatic skin lesions      Objective:       Physical Examination:  General: alert, comfortable, no apparent distress, well-appearing  Psych: alert, oriented and pleasant 3. Epidermal Inclusion Cyst calcified  Location: Rt ear lobe    Objective findings:  Small subcutaneous papulonodule firm which central pore    Patient was reassured of its benign nature      4. Milia  Location: left postauricular  Objective: 1 mm Small firm white papule. -Discussed etiology with patient and benign nature. 5. Cherry Hemangiomas  Location: torso and legs    Objective findings: Multiple bright red, dome-shaped papules     Discussed that these are benign lesions and require no treatment. Removal is usually not done unless they bleed or are irritated. Patient elected no treatment      6. Multiple benign melanocytic nevi   Location: face, back and inframammary  Objective findings: multiple brown/pink macules and papules without abnormal findings or concerning findings on exam and dermatoscopy    -Counseled the patient that these are benign and need no therapy. 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 Alec Mass protection is key to prevent further damage. 8. Actinodermatosis  In all photo-exposed areas there were numerous light brown, lacy, 3-6 mm macules and some mottled hypo- and hyperpigmentation. This was most prominent on the face, anterior chest, and shoulders. The patient was reassured that these changes do not require treatment, but indicated a significant amount of sun damage in the patient's past. The patient was briefly counseled on the importance of sun protective habits and encouraged to be seen for follow up evaluations in the future. ABCDEs of melanoma were reviewed.      9. Rosacea erythematotelangiectatic  Location: central face and chin  Objective findings: Erythema and telangiectasia The patient was  reassured that the lesion was likely benign and that no further treatment is necessary at this time. However, it was reiterated that a definitive diagnosis is not possible without a biopsy and that if the lesion should ever begin to grow, change, or become symptomatic that they should consider biopsy and/or surgical removal. F/U for this lesion is PRN. A total of 45 minutes was spent educating the patient on the need for photoprotection, importance of continued surveillance by monthly self skin examinations, and total body skin examinations with a physician. The ABCD's of melanoma and features of non-melanoma skin cancers were reviewed with the patient using handouts that were given to the patient. It was emphasized that photoprotection should include the use of photoprotective clothing and hats, sunscreens containing at least 3-5% zinc oxide or titanium dioxide, and lifestyle changes to avoid mid-day sun exposure. - Signs of cutaneous malignancy were discussed and they were encouraged to contact me if they note any evolving, bleeding, painful or non-healing lesions.    - Written material on sunscreen and samples were provided. Follow up:  Return visit in 1 year or as needed for change in condition. All questions addressed. Procedure:   Procedure: Cryotherapy  Location(s): Left lateral distal dorsal forearm   Indication:  premalignant  Total Number of Lesions: 1    Risks, benefits and alternatives were explained and verbal informed consent was obtained. The area was not photographed. The lesion was treated with cryotherapy, using liquid nitrogen, for 2 freeze cycles of approximately 10 seconds each. Skin was allowed to thaw completely before each cycle. The patient was advised to have wound examined if problems with healing occur, or evidence of infection develops. Wound care instructions were reviewed with the patient and She was advised that if the site is not completely resolved at follow-up, re-treatment with this or other method should be considered. The patient tolerated the procedure well without complications. I saw your patient Quinton Barr at the date listed above in my Dermatology  clinic in [address] [place] [address]. Thank you very much for the referral.    My exam findings, assessment and plan can be found in EPIC, I have also attached them below for your convenience. I very much appreciate your referral and the opportunity to participate in this patient's care. Please don't hesitate to contact me with questions or concerns about our visit or management of this patient in the future. Please feel free to call me anytime if I can be of any further assistance to you or to any of your patients.     Sheryle Norse, MD, MS

## 2021-01-12 DIAGNOSIS — I10 BENIGN ESSENTIAL HTN: ICD-10-CM

## 2021-01-12 RX ORDER — HYDROCHLOROTHIAZIDE 12.5 MG/1
CAPSULE, GELATIN COATED ORAL
Qty: 90 CAPSULE | Refills: 0 | OUTPATIENT
Start: 2021-01-12

## 2021-01-12 RX ORDER — OLMESARTAN MEDOXOMIL 5 MG/1
TABLET ORAL
Qty: 180 TABLET | Refills: 0 | OUTPATIENT
Start: 2021-01-12

## 2021-01-13 ENCOUNTER — OFFICE VISIT (OUTPATIENT)
Dept: INTERNAL MEDICINE CLINIC | Age: 76
End: 2021-01-13
Payer: MEDICARE

## 2021-01-13 VITALS
BODY MASS INDEX: 27.31 KG/M2 | RESPIRATION RATE: 12 BRPM | TEMPERATURE: 97.6 F | WEIGHT: 160 LBS | HEART RATE: 57 BPM | SYSTOLIC BLOOD PRESSURE: 120 MMHG | DIASTOLIC BLOOD PRESSURE: 70 MMHG | HEIGHT: 64 IN

## 2021-01-13 DIAGNOSIS — Z13.6 SCREENING FOR CARDIOVASCULAR CONDITION: ICD-10-CM

## 2021-01-13 DIAGNOSIS — F43.9 STRESS AT HOME: ICD-10-CM

## 2021-01-13 DIAGNOSIS — Z00.00 ROUTINE GENERAL MEDICAL EXAMINATION AT A HEALTH CARE FACILITY: Primary | ICD-10-CM

## 2021-01-13 PROCEDURE — G8484 FLU IMMUNIZE NO ADMIN: HCPCS | Performed by: INTERNAL MEDICINE

## 2021-01-13 PROCEDURE — 4040F PNEUMOC VAC/ADMIN/RCVD: CPT | Performed by: INTERNAL MEDICINE

## 2021-01-13 PROCEDURE — 1123F ACP DISCUSS/DSCN MKR DOCD: CPT | Performed by: INTERNAL MEDICINE

## 2021-01-13 PROCEDURE — G0439 PPPS, SUBSEQ VISIT: HCPCS | Performed by: INTERNAL MEDICINE

## 2021-01-13 PROCEDURE — G0446 INTENS BEHAVE THER CARDIO DX: HCPCS | Performed by: INTERNAL MEDICINE

## 2021-01-13 PROCEDURE — 3017F COLORECTAL CA SCREEN DOC REV: CPT | Performed by: INTERNAL MEDICINE

## 2021-01-13 ASSESSMENT — PATIENT HEALTH QUESTIONNAIRE - PHQ9
SUM OF ALL RESPONSES TO PHQ QUESTIONS 1-9: 0
SUM OF ALL RESPONSES TO PHQ QUESTIONS 1-9: 0
1. LITTLE INTEREST OR PLEASURE IN DOING THINGS: 0

## 2021-01-13 ASSESSMENT — LIFESTYLE VARIABLES: HOW OFTEN DO YOU HAVE A DRINK CONTAINING ALCOHOL: 0

## 2021-01-13 NOTE — PROGRESS NOTES
[facility]) Physicians  Internal Medicine  Patient Encounter  Bassam Velazquez D.O., [facility]       Medicare Annual Wellness Visit    Name: Brittny Rosado Date: 2021   MRN: <[id number]> Sex: Female   Age: 77 y.o. Ethnicity: Non-/Non    : 1945 Race: Joshua Preciado is here for Medicare AWV    Screenings for behavioral, psychosocial and functional/safety risks, and cognitive dysfunction are all negative except as indicated below. These results, as well as other patient data from the [facility] [address] form, are documented in Flowsheets linked to this Encounter.       Medical/Surgical Histories     Past Medical History:   Diagnosis Date    Abdominal aortic atherosclerosis ([facility] [address] 2020    Allergic rhinitis     Colon polyps     Hearing loss     Hyperlipidemia     Hypertension     Lung disease     Multinodular goiter 2020    Osteopenia     PSVT (paroxysmal supraventricular tachycardia) ([facility] [address] 2020    Diagnosed during [facility] test at [facility] Rash     Tinnitus     Venous insufficiency of both lower extremities 2020          Past Surgical History:   Procedure Laterality Date    BREAST LUMPECTOMY Right 1970    CATARACT REMOVAL WITH IMPLANT Bilateral     Dr. Daniela Lopes  2018   Sarah Forno 77      7 yo           Medications/Allergies     Medication Sig    metoprolol succinate (TOPROL XL) 25 MG extended release tablet Take 12.5 mg by mouth daily    hydroCHLOROthiazide (MICROZIDE) 12.5 MG capsule TAKE 1 CAPSULE BY MOUTH EVERY DAY IN THE MORNING    olmesartan (BENICAR) 5 MG tablet TAKE 2 TABLETS BY MOUTH EVERY DAY    aspirin 81 MG EC tablet Take 81 mg by mouth daily    Calcium Carbonate-Vitamin D (CALCIUM 600/VITAMIN D PO) Take 1 tablet by mouth 2 times daily    vitamin B-12 (CYANOCOBALAMIN) 1000 MCG tablet Take 1,000 mcg by mouth daily    Multiple Vitamins-Minerals (ICAPS AREDS 2 PO) Take 1 tablet by mouth 2 times daily    atorvastatin (LIPITOR) 10 MG tablet Take 1 tablet by mouth daily     No current facility-administered medications for this visit. Allergies   Allergen Reactions    Latex Itching    Dust Mite Extract          Substance Use History     Social History     Tobacco Use    Smoking status: Never Smoker    Smokeless tobacco: Never Used   Substance Use Topics    Alcohol use: Never     Frequency: Never    Drug use: Never          Family History     Family History   Problem Relation Age of Onset    Heart Disease Mother         CHF    Other Mother         PVD    Stroke Father 80    Coronary Art Dis Father         CHF    Atrial Fibrillation Sister     Hypertension Brother     High Cholesterol Brother     Breast Cancer Daughter 52        Stage zero, Double Mastectomy         CareTeam (Including outside providers/suppliers regularly involved in providing care):   Patient Care Team:  Jerry Alonso DO as PCP - General (Internal Medicine)  Jerry Alonso DO as PCP - REHABILITATION [facility] Empaneled Provider  Nilam Alford (Internal Medicine)  Ronna Reed MD (Cardiac Electrophysiology)  CLAY Briseno - CNP (Nurse Practitioner)  Amparo Duarte MD as Consulting Physician (Gastroenterology)      Based upon direct observation of the patient, evaluation of cognition reveals recent and remote memory intact. ROS:  SKIN:  Saw Dr. Cristina Quijano-- note reviewed. ENT:  Saw Dr. Alcon Platt for thyroid nodule. Had FNA---> Benign. She will need follow up  MS:  Saw Podiatry-- Dr. Althea Streeter, trimmed nails. PSYCH:  Pt denies depression or anxiety. She feels safe at home. Increased stress due to husbands mental health. Vitals:    01/13/21 1044   BP: 120/70   Pulse: 57   Resp: 12   Temp: 97.6 °F (36.4 °C)   Weight: 160 lb (72.6 kg)   Height: 5' 4\" (1.626 m)     Body mass index is 27.46 kg/m².      Wt Readings from Last 3 Encounters:   01/13/21 160 lb (72.6 kg)   01/05/21 159 lb 3.2 oz (72.2 kg)   12/31/20 158 lb (71.7 kg)     BP Readings from Last 3 Encounters:   01/13/21 120/70   01/05/21 114/63   12/31/20 110/66        Physical Exam    /70   Pulse 57   Temp 97.6 °F (36.4 °C)   Resp 12   Ht 5' 4\" (1.626 m)   Wt 160 lb (72.6 kg)   BMI 27.46 kg/m²   GEN:  77 y.o. female who is in NAD, A&O. She appears stated age and well nourished. She is cooperative and pleasant. CARDIAC:  S1S2 NL. Regular rhythm. No murmur/clicks/rubs. No ectopy. PMI is non-displaced. VASC:  Pedal pulses 2/4. Carotid upstrokes 2+. No bruits noted. PULM:  Lungs are CTA. Symmetric breath sounds noted. AP Diameter NL.  EXT:  No Cyanosis or clubbing. No edema. SKIN: Warm and dry, normal turgor, no rash or lesions of concern. NEURO:  Cranial nerves 2-12 are NL. Speech fluent and coherent. No focal deficits  PSYCH:  Mood and affect NL. Judgement and insight NL. Patient's complete Health Risk Assessment and screening values have been reviewed and are found in Flowsheets. The following problems were reviewed today and where indicated follow up appointments were made and/or referrals ordered. Positive Risk Factor Screenings with Interventions:            General Health and ACP:  General  In general, how would you say your health is?: Very Good  In the past 7 days, have you experienced any of the following?  New or Increased Pain, New or Increased Fatigue, Loneliness, Social Isolation, Stress or Anger?: (!) Social Isolation(Covid Pandemic)  Do you get the social and emotional support that you need?: (!) No  Do you have a Living Will?: Yes  Advance Directives     Power of  Living Will ACP-Advance Directive ACP-Power of     Not on File Not on File Not on File Not on File      General Health Risk Interventions:  · Social isolation: patient's comments regarding inadequate social support: Counseled on life stressors and caused her feelings of social isolation and lack of social support. · Inadequate social/emotional support: patient's comments regarding inadequate social support: Discussed sources social and emotional stress and lack of support. · No Living Will: ACP documents already completed- patient asked to provide copy to the office     She feels her  has dementia-- he can be forgetful, withdrawn, depressed. Very accomplished and now he is not active or involved. He can be verbally short with her. She has to remind him to perform daily activities. She tries to keep herself busy.  has been referred to Neuropsych at [facility].               Hearing/Vision:  No exam data present  Hearing/Vision  Do you or your family notice any trouble with your hearing?: No  Do you have difficulty driving, watching TV, or doing any of your daily activities because of your eyesight?: (!) Yes  Have you had an eye exam within the past year?: Yes  Hearing/Vision Interventions:  · Vision concerns:  patient encouraged to make appointment with his/her eye specialist      Personalized Preventive Plan   Current Health Maintenance Status    There is no immunization history on file for this patient. Health Maintenance   Topic Date Due    Annual Wellness Visit (AWV)  07/29/2020    Lipid screen  11/27/2021    Potassium monitoring  11/27/2021    Creatinine monitoring  11/27/2021    DEXA (modify frequency per FRAX score)  07/24/2022    Colon cancer screen colonoscopy  08/10/2023    DTaP/Tdap/Td vaccine (3 - Td) 09/19/2024    Flu vaccine  Completed    Shingles Vaccine  Completed    Pneumococcal 65+ years Vaccine  Completed    Hepatitis C screen  Completed    Hepatitis A vaccine  Aged Out    Hepatitis B vaccine  Aged Out    Hib vaccine  Aged Out    Meningococcal (ACWY) vaccine  Aged Out     Recommendations for DyMynd Due: see orders and patient instructions/AVS.  .   Recommended screening schedule for the next 5-10 years is provided to the patient in written form: see Patient Instructions/AVS.    Afshan Malik was seen today for medicare awv. Diagnoses and all orders for this visit:    Routine general medical examination at a health care facility  --All care gaps identified and addressed  --Advised she stay physically active-- we discussed a walking program.    --Strongly encouraged patient to facilitate getting her  evaluated for depression and cognitive impairment  --Lab work next visit  --Counseled on COVID-19 vaccine. -     MD Intens behave ther cardio dx, 15 minutes []    Screening for cardiovascular condition  -     MD Intens behave ther cardio dx, 15 minutes []    Stress at home  --Advised regular physical activity as well as a healthy diet. --Recommended she take time for herself. Cardiovascular Disease Risk Counseling: Assessed the patient's risk to develop cardiovascular disease and reviewed main risk factors. Reviewed steps to reduce disease risk including:   · Quitting tobacco use, reducing amount smoked, or not starting the habit  · Making healthy food choices  · Being physically active and gradualy increasing activity levels   · Reduce weight and determine a healthy BMI goal  · Monitor blood pressure and treat if higher than 140/90 mmHg  · Maintain blood total cholesterol levels under 5 mmol/l or 190 mg/dl  · Maintain LDL cholesterol levels under 3.0 mmol/l or 115 mg/dl   · Control blood glucose levels  · Consider taking aspirin (75 mg daily), once blood pressure is controlled   Provided a follow up plan.   Time spent (minutes): 15 minutes

## 2021-01-13 NOTE — PATIENT INSTRUCTIONS
Advance Directives: Care Instructions  Overview  An advance directive is a legal way to state your wishes at the end of your life. It tells your family and your doctor what to do if you can't say what you want. There are two main types of advance directives. You can change them any time your wishes change. Living will. This form tells your family and your doctor your wishes about life support and other treatment. The form is also called a declaration. Medical power of . This form lets you name a person to make treatment decisions for you when you can't speak for yourself. This person is called a health care agent (health care proxy, health care surrogate). The form is also called a durable power of  for health care. If you do not have an advance directive, decisions about your medical care may be made by a family member, or by a doctor or a  who doesn't know you. It may help to think of an advance directive as a gift to the people who care for you. If you have one, they won't have to make tough decisions by themselves. Follow-up care is a key part of your treatment and safety. Be sure to make and go to all appointments, and call your doctor if you are having problems. It's also a good idea to know your test results and keep a list of the medicines you take. What should you include in an advance directive? Many states have a unique advance directive form. (It may ask you to address specific issues.) Or you might use a universal form that's approved by many states. If your form doesn't tell you what to address, it may be hard to know what to include in your advance directive. Use the questions below to help you get started. · Who do you want to make decisions about your medical care if you are not able to? · What life-support measures do you want if you have a serious illness that gets worse over time or can't be cured? · What are you most afraid of that might happen? (Maybe you're afraid of having pain, losing your independence, or being kept alive by machines.)  · Where would you prefer to die? (Your home? A hospital? A nursing home?)  · Do you want to donate your organs when you die? · Do you want certain Anabaptist practices performed before you die? When should you call for help? Be sure to contact your doctor if you have any questions. Where can you learn more? Go to https://chpepiceweb.VuCOMP. org and sign in to your Tethis account. Enter R264 in the Peer.im box to learn more about \"Advance Directives: Care Instructions. \"     If you do not have an account, please click on the \"Sign Up Now\" link. Current as of: December 9, 2019               Content Version: 12.6  © 9629-9565 [employer]. Care instructions adapted under license by [facility] ([facility]). If you have questions about a medical condition or this instruction, always ask your healthcare professional. [place] [address] any warranty or liability for your use of this information. Learning About Living Perroy  What is a living will? A living will, also called a declaration, is a legal form. It tells your family and your doctor your wishes when you can't speak for yourself. It's used by the health professionals who will treat you as you near the end of your life or if you get seriously hurt or ill. If you put your wishes in writing, your loved ones and others will know what kind of care you want. They won't need to guess. This can ease your mind and be helpful to others. And you can change or cancel your living will at any time. A living will is not the same as an estate or property will. An estate will explains what you want to happen with your money and property after you die. How do you use it? A living will is used to describe the kinds of treatment or life support you want as you near the end of your life or if you get seriously hurt or ill. Keep these facts in mind about living price. · Your living will is used only if you can't speak or make decisions for yourself. Most often, one or more doctors must certify that you can't speak or decide for yourself before your living will takes effect. · If you get better and can speak for yourself again, you can accept or refuse any treatment. It doesn't matter what you said in your living will. · Some states may limit your right to refuse treatment in certain cases. For example, you may need to clearly state in your living will that you don't want artificial hydration and nutrition, such as being fed through a tube. Is a living will a legal document? A living will is a legal document. Each state has its own laws about living price. And a living will may be called something else in your state. Here are some things to know about living price. · You don't need an  to complete a living will. But legal advice can be helpful if your state's laws are unclear. It can also help if your health history is complicated or your family can't agree on what should be in your living will. · You can change your living will at any time. Some people find that their wishes about end-of-life care change as their health changes. If you make big changes to your living will, complete a new form. · If you move to another state, make sure that your living will is legal in the state where you now live. In most cases, doctors will respect your wishes even if you have a form from a different state. · You might use a universal form that has been approved by many states. This kind of form can sometimes be filled out and stored online. Your digital copy will then be available wherever you have a connection to the internet. The doctors and nurses who need to treat you can find it right away. · Your state may offer an online registry. This is another place where you can store your living will online.   · It's a good idea to get your living will notarized. This means using a person called a  to watch two people sign, or witness, your living will. What should you know when you create a living will? Here are some questions to ask yourself as you make your living will:  · Do you know enough about life support methods that might be used? If not, talk to your doctor so you know what might be done if you can't breathe on your own, your heart stops, or you can't swallow. · What things would you still want to be able to do after you receive life-support methods? Would you want to be able to walk? To speak? To eat on your own? To live without the help of machines? · Do you want certain Jehovah's witness practices performed if you become very ill? · If you have a choice, where do you want to be cared for? In your home? At a hospital or nursing home? · If you have a choice at the end of your life, where would you prefer to die? At home? In a hospital or nursing home? Somewhere else? · Would you prefer to be buried or cremated? · Do you want your organs to be donated after you die? What should you do with your living will? · Make sure that your family members and your health care agent have copies of your living will (also called a declaration). · Give your doctor a copy of your living will. Ask him or her to keep it as part of your medical record. If you have more than one doctor, make sure that each one has a copy. · Put a copy of your living will where it can be easily found. For example, some people may put a copy on their refrigerator door. If you are using a digital copy, be sure your doctor, family members, and health care agent know how to find and access it. Where can you learn more? Go to https://chperaquelewgaldino.Lightscape [email or web address] org and sign in to your eCommHub account. Enter P416 in the Genera Energy box to learn more about \"Learning About Living Perroy. \"     If you do not have an account, please click on the \"Sign Up protects against both UVA and UVB radiation with an SPF of 30 or greater. Reapply every 2 to 3 hours or after sweating, drying off with a towel, or swimming. · Always wear a seat belt when traveling in a car. Always wear a helmet when riding a bicycle or motorcycle. Patient Education        Learning About Stress  What is stress? Stress is what you feel when you have to handle more than you are used to. Stress is a fact of life for most people, and it affects everyone differently. What causes stress for you may not be stressful for someone else. A lot of things can cause stress. You may feel stress when you go on a job interview, take a test, or run a race. This kind of short-term stress is normal and even useful. It can help you if you need to work hard or react quickly. For example, stress can help you finish an important job on time. Stress also can last a long time. Long-term stress is caused by stressful situations or events. Examples of long-term stress include long-term health problems, ongoing problems at work, or conflicts in your family. Long-term stress can harm your health. How does stress affect your health? When you are stressed, your body responds as though you are in danger. It makes hormones that speed up your heart, make you breathe faster, and give you a burst of energy. This is called the fight-or-flight stress response. If the stress is over quickly, your body goes back to normal and no harm is done. But if stress happens too often or lasts too long, it can have bad effects. Long-term stress can make you more likely to get sick, and it can make symptoms of some diseases worse. If you tense up when you are stressed, you may develop neck, shoulder, or low back pain. Stress is linked to high blood pressure and heart disease. Stress also harms your emotional health. It can make you santiago, tense, or depressed.  Your relationships may suffer, and you may not do well at work or school. What can you do to manage stress? How to relax your mind   · Write. It may help to write about things that are bothering you. This helps you find out how much stress you feel and what is causing it. When you know this, you can find better ways to cope. · Let your feelings out. Talk, laugh, cry, and express anger when you need to. Talking with friends, family, a counselor, or a member of the clergy about your feelings is a healthy way to relieve stress. · Do something you enjoy. For example, listen to music or go to a movie. Practice your hobby or do volunteer work. · Meditate. This can help you relax, because you are not worrying about what happened before or what may happen in the future. · Do guided imagery. Imagine yourself in any setting that helps you feel calm. You can use audiotapes, books, or a teacher to guide you. How to relax your body   · Do something active. Exercise or activity can help reduce stress. Walking is a great way to get started. Even everyday activities such as housecleaning or yard work can help. · Do breathing exercises. For example:  ? From a standing position, bend forward from the waist with your knees slightly bent. Let your arms dangle close to the floor. ? Breathe in slowly and deeply as you return to a standing position. Roll up slowly and lift your head last.  ? Hold your breath for just a few seconds in the standing position. ? Breathe out slowly and bend forward from the waist.  · Try yoga or armida chi. These techniques combine exercise and meditation. You may need some training at first to learn them. What can you do to prevent stress? · Manage your time. This helps you find time to do the things you want and need to do. · Get enough sleep. Your body recovers from the stresses of the day while you are sleeping. · Get support. Your family, friends, and community can make a difference in how you experience stress. Where can you learn more?   Go to https://chpepiceweb.Snakk [email or web address] org and sign in to your SmApper Technologies account. Enter E940 in the Friendly Wager App box to learn more about \"Learning About Stress. \"     If you do not have an account, please click on the \"Sign Up Now\" link. Current as of: December 16, 2019               Content Version: 12.6  © 0491-7972 [employer]. Care instructions adapted under license by [facility] ([facility]). If you have questions about a medical condition or this instruction, always ask your healthcare professional. [address] any warranty or liability for your use of this information. Patient Education        Learning About Guided Imagery for Stress  What are guided imagery and stress? Stress is what you feel when you have to handle more than you are used to. A lot of things can cause stress. You may feel stress when you go on a job interview, take a test, or run a race. This kind of short-term stress is normal and even useful. It can help you if you need to work hard or react quickly. Stress also can last a long time. Long-term stress is caused by stressful situations or events. Examples of long-term stress include long-term health problems, ongoing problems at work, and conflicts in your family. Long-term stress can harm your health. Guided imagery is a technique of directed thoughts and suggestions that guide your mind toward a relaxed, focused state. This technique helps you use your mind to take you to a calm, peaceful place. You can use it to relax, which can lower blood pressure and reduce other problems related to stress. How does guided imagery help to relieve stress? Because of the way the mind and body are connected, guided imagery can make you feel like you are experiencing something just by imagining it. You can achieve a relaxed state when you imagine all the details of a safe, comfortable place, such as a beach or a garden.  This relaxed state may help you feel more in control of your emotions and thought processes. Feeling in control may improve your attitude, health, and sense of well-being. How do you do guided imagery? You can use a smartphone alvarez or a video to lead you through the process. You use all of your senses in guided imagery. For example, if you want a tropical setting, you can imagine the warm breeze on your skin, the bright blue of the water, the sound of the surf, the sweet scent of tropical flowers, and the taste of coconut. Imagining those things can make you actually feel like you're there. But you don't have to imagine the tropics to feel peace. Guided imagery can take you to your own restful place. To give guided imagery a try, follow these steps:  · Lean back comfortably in your chair. If you can, close your eyes. Put your arms on the armrests, or fold your hands in your lap. · Take a deep breath through your nose. Breathe in slowly, and then let the air out completely through your mouth. · Do it again slowly. Keep breathing like this. Gather up any tension in your body, and send it out with every breath. · Let a feeling of warmth spread from your lungs to your neck and head, down your arms to your fingertips, through your body and into your legs, all the way down to your toes. Stay this way for a moment. · Now imagine a pleasant day. You're at a park or at a place you've visited in the past where you felt at peace. · In your mind's eye, look at what lies in front of you. Maybe you see the sun, filtered through trees. Maybe clouds are drifting by. · Look to one side, and then the other. Notice the feel of the air around you. Notice how it feels on your face and on your arms. · Stay here for a while. Let it become real for you. Follow-up care is a key part of your treatment and safety. Be sure to make and go to all appointments, and call your doctor if you are having problems.  It's also a good idea to know your test results and keep a list of the medicines you take. Where can you learn more? Go to https://chpepiceweb.healthQiniu. org and sign in to your Geospiza account. Enter N291 in the Phizzle box to learn more about \"Learning About Guided Imagery for Stress. \"     If you do not have an account, please click on the \"Sign Up Now\" link. Current as of: December 16, 2019               Content Version: 12.6  © 3602-1322 Healthwise, Incorporated. Care instructions adapted under license by [facility] ([facility]). If you have questions about a medical condition or this instruction, always ask your healthcare professional. [place] [address] any warranty or liability for your use of this information. Patient Education        Well Visit, Over 72: Care Instructions  Your Care Instructions     Physical exams can help you stay healthy. Your doctor has checked your overall health and may have suggested ways to take good care of yourself. He or she also may have recommended tests. At home, you can help prevent illness with healthy eating, regular exercise, and other steps. Follow-up care is a key part of your treatment and safety. Be sure to make and go to all appointments, and call your doctor if you are having problems. It's also a good idea to know your test results and keep a list of the medicines you take. How can you care for yourself at home? · Reach and stay at a healthy weight. This will lower your risk for many problems, such as obesity, diabetes, heart disease, and high blood pressure. · Get at least 30 minutes of exercise on most days of the week. Walking is a good choice. You also may want to do other activities, such as running, swimming, cycling, or playing tennis or team sports. · Do not smoke. Smoking can make health problems worse. If you need help quitting, talk to your doctor about stop-smoking programs and medicines. These can increase your chances of quitting for good.   · Protect your skin from too much sun. When you're outdoors from 10 a.m. to 4 p.m., stay in the shade or cover up with clothing and a hat with a wide brim. Wear sunglasses that block UV rays. Even when it's cloudy, put broad-spectrum sunscreen (SPF 30 or higher) on any exposed skin. · See a dentist one or two times a year for checkups and to have your teeth cleaned. · Wear a seat belt in the car. Follow your doctor's advice about when to have certain tests. These tests can spot problems early. For men and women  · Cholesterol. Your doctor will tell you how often to have this done based on your overall health and other things that can increase your risk for heart attack and stroke. · Blood pressure. Have your blood pressure checked during a routine doctor visit. Your doctor will tell you how often to check your blood pressure based on your age, your blood pressure results, and other factors. · Diabetes. Ask your doctor whether you should have tests for diabetes. · Vision. Experts recommend that you have yearly exams for glaucoma and other age-related eye problems. · Hearing. Tell your doctor if you notice any change in your hearing. You can have tests to find out how well you hear. · Colon cancer tests. Keep having colon cancer tests as your doctor recommends. You can have one of several types of tests. · Heart attack and stroke risk. At least every 4 to 6 years, you should have your risk for heart attack and stroke assessed. Your doctor uses factors such as your age, blood pressure, cholesterol, and whether you smoke or have diabetes to show what your risk for a heart attack or stroke is over the next 10 years. · Osteoporosis. Talk to your doctor about whether you should have a bone density test to find out whether you have thinning bones. Ask your doctor if you need to take a calcium plus vitamin D supplement. You may be able to get enough calcium and vitamin D through your diet.   For women  · Pap test and pelvic exam. You may no

## 2021-01-16 ENCOUNTER — PATIENT MESSAGE (OUTPATIENT)
Dept: INTERNAL MEDICINE CLINIC | Age: 76
End: 2021-01-16

## 2021-01-18 RX ORDER — ATORVASTATIN CALCIUM 10 MG/1
10 TABLET, FILM COATED ORAL DAILY
Qty: 90 TABLET | Refills: 3 | Status: SHIPPED | OUTPATIENT
Start: 2021-01-18 | End: 2021-12-13

## 2021-02-17 DIAGNOSIS — I10 BENIGN ESSENTIAL HTN: ICD-10-CM

## 2021-02-17 RX ORDER — OLMESARTAN MEDOXOMIL 5 MG/1
TABLET ORAL
Qty: 180 TABLET | Refills: 1 | Status: SHIPPED | OUTPATIENT
Start: 2021-02-17 | End: 2021-07-07

## 2021-03-07 ENCOUNTER — HOSPITAL ENCOUNTER (EMERGENCY)
Age: 76
Discharge: HOME | End: 2021-03-07
Attending: EMERGENCY MEDICINE
Payer: MEDICARE

## 2021-03-07 VITALS
TEMPERATURE: 97.6 F | DIASTOLIC BLOOD PRESSURE: 54 MMHG | HEART RATE: 67 BPM | RESPIRATION RATE: 16 BRPM | WEIGHT: 158 LBS | HEIGHT: 65 IN | BODY MASS INDEX: 26.33 KG/M2 | SYSTOLIC BLOOD PRESSURE: 116 MMHG | OXYGEN SATURATION: 98 %

## 2021-03-07 DIAGNOSIS — S05.02XA ABRASION OF LEFT CORNEA, INITIAL ENCOUNTER: Primary | ICD-10-CM

## 2021-03-07 PROCEDURE — 99283 EMERGENCY DEPT VISIT LOW MDM: CPT

## 2021-03-07 PROCEDURE — 6370000000 HC RX 637 (ALT 250 FOR IP): Performed by: EMERGENCY MEDICINE

## 2021-03-07 RX ORDER — ERYTHROMYCIN 5 MG/G
OINTMENT OPHTHALMIC ONCE
Status: COMPLETED | OUTPATIENT
Start: 2021-03-07 | End: 2021-03-07

## 2021-03-07 RX ORDER — TETRACAINE HYDROCHLORIDE 5 MG/ML
1 SOLUTION OPHTHALMIC ONCE
Status: COMPLETED | OUTPATIENT
Start: 2021-03-07 | End: 2021-03-07

## 2021-03-07 RX ADMIN — TETRACAINE HYDROCHLORIDE 1 DROP: 5 SOLUTION OPHTHALMIC at 23:27

## 2021-03-07 RX ADMIN — ERYTHROMYCIN: 5 OINTMENT OPHTHALMIC at 23:50

## 2021-03-07 ASSESSMENT — PAIN DESCRIPTION - LOCATION: LOCATION: EYE

## 2021-03-07 ASSESSMENT — PAIN DESCRIPTION - FREQUENCY: FREQUENCY: CONTINUOUS

## 2021-03-08 DIAGNOSIS — I10 BENIGN ESSENTIAL HTN: ICD-10-CM

## 2021-03-08 RX ORDER — HYDROCHLOROTHIAZIDE 12.5 MG/1
CAPSULE, GELATIN COATED ORAL
Qty: 30 CAPSULE | Refills: 1 | Status: SHIPPED | OUTPATIENT
Start: 2021-03-08 | End: 2021-03-15

## 2021-03-08 RX ORDER — HYDROCHLOROTHIAZIDE 12.5 MG/1
CAPSULE, GELATIN COATED ORAL
Qty: 90 CAPSULE | Refills: 2 | OUTPATIENT
Start: 2021-03-08

## 2021-03-08 RX ORDER — HYDROCHLOROTHIAZIDE 12.5 MG/1
CAPSULE, GELATIN COATED ORAL
Qty: 90 CAPSULE | Refills: 2 | Status: CANCELLED | OUTPATIENT
Start: 2021-03-08

## 2021-03-08 NOTE — ED PROVIDER NOTES
[address] [facility]          ATTENDING PHYSICIAN NOTE       Date of evaluation: 3/7/2021    Chief Complaint     Eye Problem (Contact stuck in left eye, put in this am and 12 hour contact)      History of Present Illness     Lisandra Tubbs is a 77 y.o. female who presents with complaints of possibly having a contact lens stuck in her left eye for the last 2 hours. The patient has a foreign body sensation. She attempted to remove it and believes it is in the space between the iris and the nose under the lower eyelid. She attempted to get it out on several occasions unsuccessfully. Review of Systems     She denies any vision changes. See HPI for further details. Review of systems otherwise negative. Past Medical, Surgical, Family, and Social History     She has a past medical history of Abdominal aortic atherosclerosis ([address] Allergic rhinitis, Colon polyps, Hearing loss, Hyperlipidemia, Hypertension, Lung disease, Multinodular goiter, Osteopenia, PSVT (paroxysmal supraventricular tachycardia) ([address] Rash, Tinnitus, and Venous insufficiency of both lower extremities. She has a past surgical history that includes Colonoscopy (2018); Breast lumpectomy (Right, 1970); Cataract removal with implant (Bilateral, 2012); and Tonsillectomy and adenoidectomy. Her family history includes Atrial Fibrillation in her sister; Breast Cancer (age of onset: 52) in her daughter; Coronary Art Dis in her father; Heart Disease in her mother; High Cholesterol in her brother; Hypertension in her brother; Other in her mother; Stroke (age of onset: 80) in her father. She reports that she has never smoked. She has never used smokeless tobacco. She reports that she does not drink alcohol or use drugs.     Medications     Current Discharge Medication List      CONTINUE these medications which have NOT CHANGED    Details   olmesartan (BENICAR) 5 MG tablet TAKE 2 TABLETS BY MOUTH EVERY DAY  Qty: 180 Encounter   Medications    tetracaine (TETRAVISC) 0.5 % ophthalmic solution 1 drop    erythromycin (ROMYCIN) ophthalmic ointment       MEDICAL DECISION MAKING / ASSESSMENT / PLAN     This patient presents with symptoms of a foreign body sensation and discomfort in her left eye and feeling that she may have a retained contact lens. Exam findings are consistent with corneal abrasion. No foreign body or contact lens was noted to be retained despite thorough examination with Q-tip and eversion of the eyelids. Given that tetracaine alleviated most of her symptoms, this indicates that it is a superficial process. I suspect she sustained an abrasion due to the fact that she made multiple attempts to remove the contact unsuccessfully and the abrasion appears in the exact area where she describes a foreign body sensation. The patient will be placed on topical antibiotic and discharged home to followup with her ophthalmologist in 2 days as already scheduled. Clinical Impression     1.  Abrasion of left cornea, initial encounter        Disposition     PATIENT REFERRED TO:  your opthalmologist    In 2 days  as planned      DISCHARGE MEDICATIONS:  Current Discharge Medication List          DISPOSITION DISPOSITION Decision To Discharge 03/07/2021 11:37:17 PM          Corey Luna MD  03/07/21 1733

## 2021-03-11 ENCOUNTER — OFFICE VISIT (OUTPATIENT)
Dept: VASCULAR SURGERY | Age: 76
End: 2021-03-11
Payer: MEDICARE

## 2021-03-11 VITALS
BODY MASS INDEX: 26.33 KG/M2 | HEART RATE: 66 BPM | SYSTOLIC BLOOD PRESSURE: 108 MMHG | DIASTOLIC BLOOD PRESSURE: 62 MMHG | HEIGHT: 65 IN | TEMPERATURE: 97.7 F | WEIGHT: 158 LBS

## 2021-03-11 DIAGNOSIS — I83.93 SPIDER VEINS OF BOTH LOWER EXTREMITIES: Primary | ICD-10-CM

## 2021-03-11 PROCEDURE — 1090F PRES/ABSN URINE INCON ASSESS: CPT | Performed by: SURGERY

## 2021-03-11 PROCEDURE — 4040F PNEUMOC VAC/ADMIN/RCVD: CPT | Performed by: SURGERY

## 2021-03-11 PROCEDURE — G8427 DOCREV CUR MEDS BY ELIG CLIN: HCPCS | Performed by: SURGERY

## 2021-03-11 PROCEDURE — 1036F TOBACCO NON-USER: CPT | Performed by: SURGERY

## 2021-03-11 PROCEDURE — 1123F ACP DISCUSS/DSCN MKR DOCD: CPT | Performed by: SURGERY

## 2021-03-11 PROCEDURE — 99202 OFFICE O/P NEW SF 15 MIN: CPT | Performed by: SURGERY

## 2021-03-11 PROCEDURE — G8417 CALC BMI ABV UP PARAM F/U: HCPCS | Performed by: SURGERY

## 2021-03-11 PROCEDURE — 3017F COLORECTAL CA SCREEN DOC REV: CPT | Performed by: SURGERY

## 2021-03-11 PROCEDURE — G8484 FLU IMMUNIZE NO ADMIN: HCPCS | Performed by: SURGERY

## 2021-03-11 PROCEDURE — G8400 PT W/DXA NO RESULTS DOC: HCPCS | Performed by: SURGERY

## 2021-03-11 NOTE — PROGRESS NOTES
Subjective:      Patient ID: Cy Wyatt is a 77 y.o. female. HPI    Review of Systems   HENT: Positive for postnasal drip. Cardiovascular:        Pt says she has SVT    Musculoskeletal:        Pt says that she has disc in lower back that are disintegrating    All other systems reviewed and are negative.       Objective:   Physical Exam    Assessment:      ***      Plan:      ***        Willy Staley MA

## 2021-03-14 DIAGNOSIS — I10 BENIGN ESSENTIAL HTN: ICD-10-CM

## 2021-03-15 RX ORDER — HYDROCHLOROTHIAZIDE 12.5 MG/1
CAPSULE, GELATIN COATED ORAL
Qty: 90 CAPSULE | Refills: 1 | Status: SHIPPED | OUTPATIENT
Start: 2021-03-15 | End: 2021-09-21

## 2021-04-13 ENCOUNTER — TELEPHONE (OUTPATIENT)
Dept: INTERNAL MEDICINE CLINIC | Age: 76
End: 2021-04-13

## 2021-04-13 NOTE — TELEPHONE ENCOUNTER
----- Message from Lorriestee Moise sent at 4/13/2021 12:22 PM EDT -----  Subject: Message to Provider    QUESTIONS  Information for Provider? PT wants to know if she still schedule the CT   scan on LT side of neck area (soft tissue)   ---------------------------------------------------------------------------  --------------  CALL BACK INFO  What is the best way for the office to contact you? OK to leave message on   voicemail  Preferred Call Back Phone Number? [phone]  ---------------------------------------------------------------------------  --------------  SCRIPT ANSWERS  Relationship to Patient?  Self

## 2021-04-13 NOTE — TELEPHONE ENCOUNTER
Patient saw Dr Kenan Rivers in January with the following results:  Do you still need the CT or can  I cancel    FINAL DIAGNOSIS:     Right Thyroid, Fine Needle Aspiration:   -  Benign   -  Benign-appearing follicular cells with background blood and cyst   contents.      JOSE/JOSE       CLINICAL DIAGNOSIS:    associated diagnosis: neck swelling, multiple   thyroid nodules     SPECIMEN:   THYROID, RIGHT     GROSS DESCRIPTION:  Received are 4 smeared slides and a specimen in   CytoLyt.   JOSE/JOSE     MICROSCOPIC DESCRIPTION: Microscopic examination performed, including   liquid monolayer slide and H&E stained cell block sections.      CPT: 02988 X1   [id number] X1   Copy to: Teddy Car DO   Case signed out at [facility],   [address] [address]   Specimen was processed and screened at [facility],   [address] [place], [place] [address]       Radha Zayas M.D.   (Electronic Signature)   01/07/2021

## 2021-04-14 ENCOUNTER — HOSPITAL ENCOUNTER (OUTPATIENT)
Dept: CT IMAGING | Age: 76
Discharge: HOME | End: 2021-04-14
Payer: MEDICARE

## 2021-04-14 DIAGNOSIS — E04.2 MULTIPLE THYROID NODULES: ICD-10-CM

## 2021-04-14 DIAGNOSIS — R22.2 SUPRACLAVICULAR FOSSA FULLNESS: ICD-10-CM

## 2021-04-14 DIAGNOSIS — R22.2 SUPRACLAVICULAR MASS: ICD-10-CM

## 2021-04-14 LAB
GFR AFRICAN AMERICAN: >60
GFR NON-AFRICAN AMERICAN: 54
PERFORMED ON: ABNORMAL
POC CREATININE: 1 MG/DL (ref 0.6–1.2)
POC SAMPLE TYPE: ABNORMAL

## 2021-04-14 PROCEDURE — 6360000004 HC RX CONTRAST MEDICATION: Performed by: INTERNAL MEDICINE

## 2021-04-14 PROCEDURE — 82565 ASSAY OF CREATININE: CPT

## 2021-04-14 PROCEDURE — 70491 CT SOFT TISSUE NECK W/DYE: CPT

## 2021-04-14 RX ADMIN — IOPAMIDOL 80 ML: 755 INJECTION, SOLUTION INTRAVENOUS at 15:51

## 2021-04-22 ENCOUNTER — OFFICE VISIT (OUTPATIENT)
Dept: VASCULAR SURGERY | Age: 76
End: 2021-04-22
Payer: MEDICARE

## 2021-04-22 VITALS
SYSTOLIC BLOOD PRESSURE: 98 MMHG | HEIGHT: 64 IN | BODY MASS INDEX: 26.98 KG/M2 | WEIGHT: 158 LBS | HEART RATE: 62 BPM | DIASTOLIC BLOOD PRESSURE: 63 MMHG

## 2021-04-22 DIAGNOSIS — I83.93 SPIDER VEINS OF BOTH LOWER EXTREMITIES: Primary | ICD-10-CM

## 2021-04-22 PROCEDURE — 36468 NJX SCLRSNT SPIDER VEINS: CPT | Performed by: SURGERY

## 2021-04-22 PROCEDURE — 99999 PR OFFICE/OUTPT VISIT,PROCEDURE ONLY: CPT | Performed by: SURGERY

## 2021-04-22 NOTE — PROGRESS NOTES
SCLEROTHERAPY office visit      PRIOR TO TREATMENT confirmed that patient understood the planned procedure and had all questions answered. right left   SPIDERS Thigh/calf Thigh/calf   RETICULAR Thigh/pop Thigh/pop   VARICOSE  Ant knee         0.2% STD    syringes =    cc  LOCATION      0.5% PD  12  syringes =  36  cc  LOCATION  As above inc VV    Glycerin    syringes =    cc  LOCATION            __x_Patient tolerated procedure well without any allergic reaction or complications    __x_Compression Hose Applied    __x_Post-Procedure Questions Answered      FOLLOW UP:  4-6 weeks.  Possible microthrombectomies

## 2021-05-04 ENCOUNTER — TELEPHONE (OUTPATIENT)
Dept: VASCULAR SURGERY | Age: 76
End: 2021-05-04

## 2021-05-04 NOTE — TELEPHONE ENCOUNTER
Patient left messafe regarding concerns after sclerotherapy. She had treatment on   4/22/21 and now as some swelling and discomfort. Left message for patient to call back to discuss. Patient advised to follow up with PRIMARY CARE DOCTOR IN 1-2 DAYS AND ENT (Ear, nose and throat) within week and told to return to the emergency department immediately for any new or concerning symptoms such as fevers, chills, worsening pain or discharge OR ANY OTHER COMPLAINTS. Patient agrees with plan.    Continue the oral antibiotics you have been taking  Continue to the antibiotic drops ciprodex for 10 days   Follow up with ENT   Return if any new or worsening symptoms

## 2021-05-04 NOTE — TELEPHONE ENCOUNTER
Patient called back. Pain up and down right shin, veins are protruding, areas with scabs are concerning. Madie would like to be evaluated.    Appointment made for Thursday 5/6

## 2021-05-06 ENCOUNTER — OFFICE VISIT (OUTPATIENT)
Dept: VASCULAR SURGERY | Age: 76
End: 2021-05-06

## 2021-05-06 VITALS — WEIGHT: 158 LBS | HEIGHT: 65 IN | BODY MASS INDEX: 26.33 KG/M2

## 2021-05-06 DIAGNOSIS — I83.93 SPIDER VEINS OF BOTH LOWER EXTREMITIES: Primary | ICD-10-CM

## 2021-05-06 PROCEDURE — 99024 POSTOP FOLLOW-UP VISIT: CPT | Performed by: SURGERY

## 2021-05-06 NOTE — PROGRESS NOTES
SCLEROTHERAPY follow up    Results of last treatment    XXX Partial resolution - overall good response     Complete resolution     No change    Comments:    Expected initial response. Pt was concerned about \"black areas\" and tenderness along R medial calf.       Complications of last treatment     None      Blistering     Matting     XXX Ulcer - lower lateral L leg, scabbed     Hypopigmentation    Cellulitis     Hyperpigmentation   XXX Other - areas of clot in larger clusters as expected          FOLLOW UP/RETURN FOR TREATMENT: 3 weeks as scheduled for microthrombectomies

## 2021-05-20 ENCOUNTER — OFFICE VISIT (OUTPATIENT)
Dept: VASCULAR SURGERY | Age: 76
End: 2021-05-20

## 2021-05-20 VITALS — BODY MASS INDEX: 26.33 KG/M2 | HEIGHT: 65 IN | WEIGHT: 158 LBS

## 2021-05-20 DIAGNOSIS — I83.93 SPIDER VEINS OF BOTH LOWER EXTREMITIES: Primary | ICD-10-CM

## 2021-05-20 PROCEDURE — 99024 POSTOP FOLLOW-UP VISIT: CPT | Performed by: SURGERY

## 2021-07-07 DIAGNOSIS — I10 BENIGN ESSENTIAL HTN: ICD-10-CM

## 2021-07-07 RX ORDER — OLMESARTAN MEDOXOMIL 5 MG/1
TABLET ORAL
Qty: 180 TABLET | Refills: 1 | Status: SHIPPED | OUTPATIENT
Start: 2021-07-07 | End: 2021-12-13

## 2021-07-12 ENCOUNTER — OFFICE VISIT (OUTPATIENT)
Dept: INTERNAL MEDICINE CLINIC | Age: 76
End: 2021-07-12
Payer: MEDICARE

## 2021-07-12 VITALS
SYSTOLIC BLOOD PRESSURE: 110 MMHG | HEART RATE: 64 BPM | BODY MASS INDEX: 27.04 KG/M2 | DIASTOLIC BLOOD PRESSURE: 60 MMHG | WEIGHT: 160 LBS

## 2021-07-12 DIAGNOSIS — I70.0 ABDOMINAL AORTIC ATHEROSCLEROSIS (HCC): ICD-10-CM

## 2021-07-12 DIAGNOSIS — E78.5 HYPERLIPIDEMIA, UNSPECIFIED HYPERLIPIDEMIA TYPE: ICD-10-CM

## 2021-07-12 DIAGNOSIS — E55.9 VITAMIN D DEFICIENCY: ICD-10-CM

## 2021-07-12 DIAGNOSIS — M85.80 OSTEOPENIA, UNSPECIFIED LOCATION: ICD-10-CM

## 2021-07-12 DIAGNOSIS — E53.8 B12 DEFICIENCY: ICD-10-CM

## 2021-07-12 DIAGNOSIS — I10 BENIGN ESSENTIAL HTN: Primary | ICD-10-CM

## 2021-07-12 DIAGNOSIS — I47.1 PSVT (PAROXYSMAL SUPRAVENTRICULAR TACHYCARDIA) (HCC): ICD-10-CM

## 2021-07-12 PROCEDURE — G8400 PT W/DXA NO RESULTS DOC: HCPCS | Performed by: INTERNAL MEDICINE

## 2021-07-12 PROCEDURE — G8427 DOCREV CUR MEDS BY ELIG CLIN: HCPCS | Performed by: INTERNAL MEDICINE

## 2021-07-12 PROCEDURE — 1123F ACP DISCUSS/DSCN MKR DOCD: CPT | Performed by: INTERNAL MEDICINE

## 2021-07-12 PROCEDURE — 3017F COLORECTAL CA SCREEN DOC REV: CPT | Performed by: INTERNAL MEDICINE

## 2021-07-12 PROCEDURE — 1090F PRES/ABSN URINE INCON ASSESS: CPT | Performed by: INTERNAL MEDICINE

## 2021-07-12 PROCEDURE — 4040F PNEUMOC VAC/ADMIN/RCVD: CPT | Performed by: INTERNAL MEDICINE

## 2021-07-12 PROCEDURE — 99214 OFFICE O/P EST MOD 30 MIN: CPT | Performed by: INTERNAL MEDICINE

## 2021-07-12 PROCEDURE — G8417 CALC BMI ABV UP PARAM F/U: HCPCS | Performed by: INTERNAL MEDICINE

## 2021-07-12 PROCEDURE — 1036F TOBACCO NON-USER: CPT | Performed by: INTERNAL MEDICINE

## 2021-07-12 SDOH — ECONOMIC STABILITY: FOOD INSECURITY: WITHIN THE PAST 12 MONTHS, THE FOOD YOU BOUGHT JUST DIDN'T LAST AND YOU DIDN'T HAVE MONEY TO GET MORE.: NEVER TRUE

## 2021-07-12 SDOH — ECONOMIC STABILITY: FOOD INSECURITY: WITHIN THE PAST 12 MONTHS, YOU WORRIED THAT YOUR FOOD WOULD RUN OUT BEFORE YOU GOT MONEY TO BUY MORE.: NEVER TRUE

## 2021-07-12 ASSESSMENT — SOCIAL DETERMINANTS OF HEALTH (SDOH): HOW HARD IS IT FOR YOU TO PAY FOR THE VERY BASICS LIKE FOOD, HOUSING, MEDICAL CARE, AND HEATING?: NOT HARD AT ALL

## 2021-07-12 NOTE — PROGRESS NOTES
[facility]) Physicians  Internal Medicine  Patient Encounter  Izzy Brambila D.O., [facility]        Chief Complaint   Patient presents with    6 Month Follow-Up       HPI: 77 y.o. female seen today requesting her routine follow-up regarding the status of current chronic medical problems as the below along with a medication review and reconciliation. She states she feels great! Patient has multiple medical problems including but not limited to PSVT, hypertension, hyperlipidemia, abdominal aortic atherosclerosis, colon polyps, venous insufficiency of both lower extremities, B12 deficiency, osteopenia, vitamin D deficiency, and multinodular goiter. She was last seen by EP CNP 12/22/2020. Note reviewed. She was bradycardic. Toprol XL was decreased to 12.5 mg daily. She has an appointment next week. Patient denies any new concerns. She specifically denies any chest pain, chest tightness, shortness of breath, dyspnea with exertion, palpitations, heart racing, orthopnea, increased lower extremity swelling. She denies any abdominal pain, nausea, vomiting, diarrhea. She denies any hematuria or hematochezia. She also denies any problems with headaches or dizziness. She denies any claudication.       Past Medical History:   Diagnosis Date    Abdominal aortic atherosclerosis ([facility] [address] 7/29/2020    Allergic rhinitis     Colon polyps     Hearing loss     Hyperlipidemia     Hypertension     Lung disease     Multinodular goiter 7/29/2020    Osteopenia     PSVT (paroxysmal supraventricular tachycardia) ([facility] [address] 07/24/2020    Diagnosed during stress test at [facility] Rash     Tinnitus     Venous insufficiency of both lower extremities 7/29/2020    Vitamin D deficiency 7/29/2020         MEDICATIONS:  Medication Sig   olmesartan (BENICAR) 5 MG tablet TAKE 2 TABLETS BY MOUTH EVERY DAY   hydroCHLOROthiazide (MICROZIDE) 12.5 MG capsule TAKE 1 CAPSULE BY MOUTH EVERY DAY IN THE MORNING atorvastatin (LIPITOR) 10 MG tablet Take 1 tablet by mouth daily   metoprolol succinate (TOPROL XL) 25 MG extended release tablet Take 12.5 mg by mouth daily   aspirin 81 MG EC tablet Take 81 mg by mouth daily   Calcium Carbonate-Vitamin D (CALCIUM 600/VITAMIN D PO) Take 1 tablet by mouth 2 times daily   vitamin B-12 (CYANOCOBALAMIN) 1000 MCG tablet Take 1,000 mcg by mouth daily   Multiple Vitamins-Minerals (ICAPS AREDS 2 PO) Take 1 tablet by mouth 2 times daily           Review of Systems - As per HPI  GEN: Pt denies fever, chills or night sweats. Denies weight changes. Appetite good  HEENT: Pt denies visual and auditory changes, epistaxis, upper respiratory symptoms and dysphagia  CV: Pt denies CP, SOB, VICTORIA, orthopnea palpitations, LE swelling, and claudication. PULM: Pt denies cough, wheezing or sputum production  GI: Pt denies N/V/D, heart burn, rectal bleeding, or melena. NEURO: Pt denies unilateral weakness, paresthesia and dizziness. OBJECTIVE:  Vitals:    07/12/21 1047   BP: 110/60   Pulse: 64   Weight: 160 lb (72.6 kg)     Body mass index is 27.04 kg/m². Wt Readings from Last 3 Encounters:   07/12/21 160 lb (72.6 kg)   05/20/21 158 lb (71.7 kg)   05/06/21 158 lb (71.7 kg)     BP Readings from Last 3 Encounters:   07/12/21 110/60   04/22/21 98/63   03/11/21 108/62      GEN: NAD, A&O, Non-toxic  HEENT: NC/AT, KATE, EOMI, Oral cavity Clear, TM's NL, anicteric  NECK: Supple. No thyromegaly. No JVD  LYMPH: No C/SC nodes. CV: S1 S2 NL, RRR. No murmurs, clicks or rubs. PULM: CTA, symmentric air exchange  EXT: No C/C/E  GI: Abdomen is soft, NT, BS+, No hepatomegaly. No masses. NEURO: No focal or lateralizing deficits. VASC:  No carotid bruits. Pulses symmetric  SKIN:  No rashes or lesions of concern      ASSESSMENT/PLAN:    1. Benign essential HTN  Blood pressure is well controlled  Continue current medication  Caution with diuretic. Monitor for hypotension.   Check renal function and electrolytes  - Comprehensive Metabolic Panel; Future  - Lipid Panel; Future  - TSH without Reflex; Future    2. Abdominal aortic atherosclerosis (HCC)  Condition is asymptomatic  Continue efforts with cardiovascular disease risk factor management  Continue current medications    3. PSVT (paroxysmal supraventricular tachycardia) (HCC)  Condition is well controlled without signs of recurrence  Continue low-dose beta-blocker  Continue to follow-up with EP as scheduled. She has an appointment scheduled for next week. 4. Vitamin D deficiency  Condition control is uncertain  Continue calcium and vitamin D supplementation  Recheck level  - Vitamin D 25 Hydroxy; Future    5. B12 deficiency  Condition was improved last lab check  Recheck  Continue oral B12 supplement  - VITAMIN B12; Future    6. Hyperlipidemia, unspecified hyperlipidemia type  Condition stability and control are uncertain. Due for lab  Continue efforts with lifestyle modification. Patient counseled  - Comprehensive Metabolic Panel; Future  - Lipid Panel; Future    7. Osteopenia, unspecified location  Condition stability is uncertain  She will be due for DEXA scan in 2022. Continue calcium and vitamin D supplementation. Discussed medications with patient who voiced understanding of their use, indication and potential side effects. Pt also understands the above recommendations. All questions answered. This note was generated completely or in part utilizing Dragon dictation speech recognition software. Occasionally, words are mistranscribed and despite editing, the text may contain inaccuracies due to incorrect word recognition.   If further clarification is needed please contact the office at [phone]       Electronically signed    Norma Roberto D.O.

## 2021-07-12 NOTE — PATIENT INSTRUCTIONS
Patient Education        Learning About Low Bone Density  What is low bone density? Low bone density (sometimes called osteopenia) is a decrease in thickness, or density, in bones. That means the bones become thinner and weaker. It is much more common in women than in men. It's important to know that low bone density is not a disease. It can happen normally with aging. Having low bone density means that there is a greater risk that you may get osteoporosis. It also means that you are more likely to break a bone than someone who does not have low bone density. But not everyone with low bone density gets osteoporosis or breaks a bone. Low bone density doesn't cause any symptoms. It's usually found with a type of X-ray called a bone density test. Low bone density means that your bone density result (T-score) is between 1.0 and 2.5. What increases your risk for low bone density? Things that increase your risk include:  · Getting older. · Having a family history of osteoporosis. · Being thin. · Being white or . · Getting too little physical activity. · Smoking. · Drinking too much alcohol often. · Using certain medicines such as steroids. How can you prevent osteoporosis? There are things you can do to help prevent osteoporosis. Certain lifestyle changes will help slow the loss of bone density. · Eat food that has plenty of calcium and vitamin D. Yogurt, cheese, milk, and dark green vegetables are high in calcium. Eggs, fatty fish, cereal, and fortified milk are high in vitamin D.  · Ask your doctor if you need to take a calcium plus vitamin D supplement. You may be able to get enough calcium and vitamin D through your diet. · Get regular exercise. ? Do 30 minutes of weight-bearing exercise on most days of the week. Walking, jogging, stair climbing, and dancing are good choices. ? Do resistance exercises with weights or elastic bands 2 or 3 days a week.   · Limit alcohol to 2 drinks a day for men and 1 drink a day for women. Too much alcohol can cause health problems. · Do not smoke. Smoking can make bones thin faster. If you need help quitting, talk to your doctor about stop-smoking programs and medicines. These can increase your chances of quitting for good. Prescription medicines are available for treating low bone density. But these are more often used to treat osteoporosis. Follow-up care is a key part of your treatment and safety. Be sure to make and go to all appointments, and call your doctor if you are having problems. It's also a good idea to know your test results and keep a list of the medicines you take. Where can you learn more? Go to https://DiBcompeAtonometricseweb.MightyQuiz. org and sign in to your QuantuModeling account. Enter V191 in the Chefs Feed box to learn more about \"Learning About Low Bone Density. \"     If you do not have an account, please click on the \"Sign Up Now\" link. Current as of: December 7, 2020               Content Version: 12.9  © 2006-2021 via680. Care instructions adapted under license by [facility] ([facility]). If you have questions about a medical condition or this instruction, always ask your healthcare professional. [place] [address] any warranty or liability for your use of this information. Patient Education        Supraventricular Tachycardia: Care Instructions  Overview     Having supraventricular tachycardia (SVT) means that sometimes your heart beats abnormally fast. This fast rhythm is caused by changes in the electrical system of your heart. You may feel a fluttering in your chest (palpitations) and have a fast pulse. When your heart is beating fast, you may feel anxious and lightheaded, be short of breath, and feel discomfort in the chest.  Your doctor may prescribe medicines to help slow down your heartbeat. Your doctor may also suggest you try vagal maneuvers to help slow your heart rate.  Your doctor can show rhythm. · Do not smoke. Smoking can make this condition worse. If you need help quitting, talk to your doctor about stop-smoking programs and medicines. These can increase your chances of quitting for good. · Be alert for new or worsening symptoms, such as shortness of breath, pounding of your heart, or unusual tiredness. If new symptoms develop or your symptoms become worse, call your doctor. When should you call for help? Call 911 anytime you think you may need emergency care. For example, call if:    · You passed out (lost consciousness).     · You are short of breath. Call your doctor now or seek immediate medical care if:    · You have a fast heartbeat.     · You are dizzy or lightheaded, or feel like you may faint. Watch closely for changes in your health, and be sure to contact your doctor if:    · You do not get better as expected. Where can you learn more? Go to https://Cybersource.Behavio. org and sign in to your NuoDB account. Enter G244 in the ZenoLink box to learn more about \"Supraventricular Tachycardia: Care Instructions. \"     If you do not have an account, please click on the \"Sign Up Now\" link. Current as of: August 31, 2020               Content Version: 12.9  © 9122-2421 Healthwise, Trada. Care instructions adapted under license by [facility] ([facility]). If you have questions about a medical condition or this instruction, always ask your healthcare professional. [place] [address] any warranty or liability for your use of this information. Patient Education        Vitamin B12 Deficiency: Care Instructions  Overview    A vitamin B12 deficiency means that your body doesn't have enough of this vitamin. You need vitamin B12 to keep red blood cells and nerve cells healthy. Not enough B12 can cause anemia. It can also damage nerves and cause trouble with memory and thinking. Many things can cause low levels of vitamin B12.  They include:  · Not getting enough of this vitamin through food. · An autoimmune problem, like pernicious anemia. · Weight-loss surgery, like gastric bypass. · Long-term use of heartburn medicines. Low levels of B12 may not cause symptoms. But symptoms may include fatigue, depression, and thinking or memory problems. You may have tingling in your hands or feet and changes in the way you walk. Treatment depends on the reason for low vitamin B12. Eating more foods rich in B12 may be enough. Or you might take the vitamin as a pill, as shots, or as nasal spray. How can you care for yourself? · Take vitamin B12 as your doctor recommends. · Go to your appointments if you are getting B12 shots. · Eat more foods rich in vitamin B12. Examples are:  ? Animal products. These include meat, seafood, milk products, poultry, and eggs. ? Foods that have B12 added. These are called fortified foods. They include soy products, nutritional yeast, and dry cereals. · Work with a nutritionist or dietitian if you need help getting more vitamin B12 from food. · Talk to your doctor about stopping medicines if they are adding to your B12 deficiency. When should you call for help? Call 911  anytime you think you may need emergency care. For example, call if:    · You passed out (lost consciousness). Call your doctor now or seek immediate medical care if:    · You are dizzy or lightheaded, or you feel like you may faint. Watch closely for changes in your health. Be sure to call your doctor if:    · You are confused or can't think clearly.     · You don't get better as expected. Where can you learn more? Go to https://1o1Mediamerritt.Green Momit. org and sign in to your Clearstream.TV account. Enter [phone] in the [employer] box to learn more about \"Vitamin B12 Deficiency: Care Instructions. \"     If you do not have an account, please click on the \"Sign Up Now\" link.   Current as of: September 23, 2020               Content Version: 12.9  © 0465-1074 Healthwise, AktiveBay. Care instructions adapted under license by [facility] ([facility]). If you have questions about a medical condition or this instruction, always ask your healthcare professional. [address] any warranty or liability for your use of this information. Patient Education        Learning About Vitamin D  Why is it important to get enough vitamin D? Your body needs vitamin D to absorb calcium. Calcium keeps your bones and muscles, including your heart, healthy and strong. If your muscles don't get enough calcium, they can cramp, hurt, or feel weak. You may have long-term (chronic) muscle aches and pains. If you don't get enough vitamin D throughout life, you have an increased chance of having thin and brittle bones (osteoporosis) in your later years. Children who don't get enough vitamin D may not grow as much as others their age. They also have a chance of getting a rare disease called rickets. It causes weak bones. Vitamin D and calcium are added to many foods. And your body uses sunshine to make its own vitamin D. How much vitamin D do you need? The recommended daily allowance (RDA) for vitamin D is 600 IU (international units) every day for people ages 3 through 79. Adults 71 and older need 800 IU every day. Blood tests for vitamin D can check your vitamin D level. But there is no standard normal range used by all laboratories. You're likely getting enough vitamin D if your levels are in the range of 20 to 50 ng/mL. How can you get more vitamin D? Foods that contain vitamin D include:  · [place], tuna, and mackerel. These are some of the best foods to eat when you need to get more vitamin D.  · Cheese, egg yolks, and beef liver. These foods have vitamin D in small amounts. · Milk, soy drinks, orange juice, yogurt, margarine, and some kinds of cereal have vitamin D added to them. Some people don't make vitamin D as well as others. They may have to take extra care in getting enough vitamin D. Things that reduce how much vitamin D your body makes include:  · Dark skin, such as many  Americans have. · Age, especially if you are older than 72. · Digestive problems, such as Crohn's or celiac disease. · Liver and kidney disease. Some people who do not get enough vitamin D may need supplements. Are there any risks from taking vitamin D?  · Too much vitamin D:  ? Can damage your kidneys. ? Can cause nausea and vomiting, constipation, and weakness. ? Raises the amount of calcium in your blood. If this happens, you can get confused or have an irregular heart rhythm. · Vitamin D may interact with other medicines. Tell your doctor about all of the medicines you take, including over-the-counter drugs, herbs, and pills. Tell your doctor about all of your current medical problems. Where can you learn more? Go to https://chperaquelewgaldino.DialMyApp. org and sign in to your Hootsuite account. Enter [id number] in the Leevia box to learn more about \"Learning About Vitamin D. \"     If you do not have an account, please click on the \"Sign Up Now\" link. Current as of: December 17, 2020               Content Version: 12.9  © 2006-2021 Healthwise, Incorporated. Care instructions adapted under license by [facility] ([facility]). If you have questions about a medical condition or this instruction, always ask your healthcare professional. [place] [address] any warranty or liability for your use of this information.

## 2021-09-21 DIAGNOSIS — I10 BENIGN ESSENTIAL HTN: ICD-10-CM

## 2021-09-21 RX ORDER — HYDROCHLOROTHIAZIDE 12.5 MG/1
CAPSULE, GELATIN COATED ORAL
Qty: 90 CAPSULE | Refills: 1 | Status: SHIPPED | OUTPATIENT
Start: 2021-09-21 | End: 2022-03-21

## 2021-09-23 ENCOUNTER — TELEPHONE (OUTPATIENT)
Dept: INTERNAL MEDICINE CLINIC | Age: 76
End: 2021-09-23

## 2021-09-23 ENCOUNTER — OFFICE VISIT (OUTPATIENT)
Dept: INTERNAL MEDICINE CLINIC | Age: 76
End: 2021-09-23
Payer: MEDICARE

## 2021-09-23 VITALS
BODY MASS INDEX: 26.87 KG/M2 | HEART RATE: 71 BPM | DIASTOLIC BLOOD PRESSURE: 60 MMHG | SYSTOLIC BLOOD PRESSURE: 98 MMHG | WEIGHT: 159 LBS

## 2021-09-23 DIAGNOSIS — N39.0 URINARY TRACT INFECTION WITHOUT HEMATURIA, SITE UNSPECIFIED: Primary | ICD-10-CM

## 2021-09-23 DIAGNOSIS — R39.15 URGENCY OF URINATION: ICD-10-CM

## 2021-09-23 LAB
BILIRUBIN, POC: NORMAL
BLOOD URINE, POC: NORMAL
CLARITY, POC: NORMAL
COLOR, POC: NORMAL
GLUCOSE URINE, POC: NORMAL
KETONES, POC: NORMAL
LEUKOCYTE EST, POC: NORMAL
NITRITE, POC: NORMAL
PH, POC: 5
PROTEIN, POC: NORMAL
SPECIFIC GRAVITY, POC: 1.02
UROBILINOGEN, POC: NORMAL

## 2021-09-23 PROCEDURE — 99213 OFFICE O/P EST LOW 20 MIN: CPT | Performed by: INTERNAL MEDICINE

## 2021-09-23 PROCEDURE — G8427 DOCREV CUR MEDS BY ELIG CLIN: HCPCS | Performed by: INTERNAL MEDICINE

## 2021-09-23 PROCEDURE — 1090F PRES/ABSN URINE INCON ASSESS: CPT | Performed by: INTERNAL MEDICINE

## 2021-09-23 PROCEDURE — G8400 PT W/DXA NO RESULTS DOC: HCPCS | Performed by: INTERNAL MEDICINE

## 2021-09-23 PROCEDURE — 1123F ACP DISCUSS/DSCN MKR DOCD: CPT | Performed by: INTERNAL MEDICINE

## 2021-09-23 PROCEDURE — 3017F COLORECTAL CA SCREEN DOC REV: CPT | Performed by: INTERNAL MEDICINE

## 2021-09-23 PROCEDURE — 1036F TOBACCO NON-USER: CPT | Performed by: INTERNAL MEDICINE

## 2021-09-23 PROCEDURE — G8417 CALC BMI ABV UP PARAM F/U: HCPCS | Performed by: INTERNAL MEDICINE

## 2021-09-23 PROCEDURE — 4040F PNEUMOC VAC/ADMIN/RCVD: CPT | Performed by: INTERNAL MEDICINE

## 2021-09-23 PROCEDURE — 81002 URINALYSIS NONAUTO W/O SCOPE: CPT | Performed by: INTERNAL MEDICINE

## 2021-09-23 RX ORDER — CEFUROXIME AXETIL 250 MG/1
250 TABLET ORAL 2 TIMES DAILY
Qty: 14 TABLET | Refills: 0 | Status: SHIPPED | OUTPATIENT
Start: 2021-09-23 | End: 2021-09-30

## 2021-09-23 NOTE — TELEPHONE ENCOUNTER
Patient suspects she has a UTI/bladder infection. She says she recognizes the symptoms (frequent urination with burning and itching about every 30 minutes with pressure left still after urinating). She would like to drop off a sample of urine. I told her to wait for Clinical staff to contact her to see how they want to handle (put order in etc). Please call her at number provided to discuss how to proceed.

## 2021-09-23 NOTE — PROGRESS NOTES
[facility]) Physicians  Internal Medicine  Patient Encounter  Jalen De Leon D.O., Arthur wray        Chief Complaint   Patient presents with    Urinary Frequency     urgency, burning       HPI: 77 y.o. female urgently today with complaint of increased urinary frequency, burning, and urgency. Symptoms feel similar to prior UTIs. Thumb started 5 days ago. She denies any fever, chills, sweats, flank pain, abdominal pain or pelvic pain. She denies any vaginal discharge. The urine has not been malodorous or cloudy.     Past Medical History:   Diagnosis Date    Abdominal aortic atherosclerosis ([facility] [address] 7/29/2020    Allergic rhinitis     Colon polyps     Hearing loss     Hyperlipidemia     Hypertension     Lung disease     Multinodular goiter 7/29/2020    Osteopenia     PSVT (paroxysmal supraventricular tachycardia) ([facility] [address] 07/24/2020    Diagnosed during stress test at [facility] Rash     Tinnitus     Venous insufficiency of both lower extremities 7/29/2020    Vitamin D deficiency 7/29/2020         MEDICATIONS:  Medication Sig   POTASSIUM PO Take by mouth daily   hydroCHLOROthiazide (MICROZIDE) 12.5 MG capsule TAKE 1 CAPSULE BY MOUTH EVERY DAY IN THE MORNING   olmesartan (BENICAR) 5 MG tablet TAKE 2 TABLETS BY MOUTH EVERY DAY   atorvastatin (LIPITOR) 10 MG tablet Take 1 tablet by mouth daily   aspirin 81 MG EC tablet Take 81 mg by mouth daily   Calcium Carbonate-Vitamin D (CALCIUM 600/VITAMIN D PO) Take 1 tablet by mouth 2 times daily   vitamin B-12 (CYANOCOBALAMIN) 1000 MCG tablet Take 1,000 mcg by mouth daily   Multiple Vitamins-Minerals (ICAPS AREDS 2 PO) Take 1 tablet by mouth 2 times daily   metoprolol succinate (TOPROL XL) 25 MG extended release tablet Take 12.5 mg by mouth daily  Patient not taking: Reported on 9/23/2021           Review of Systems - As per HPI      OBJECTIVE:  BP 98/60   Pulse 71   Wt 159 lb (72.1 kg)   BMI 26.87 kg/m²   GEN: NAD, A&O, Non-toxic  HEENT: NC/AT, BIANCA LOVE, Oral cavity Clear,  TM's NL, Nasal cavity clear. NECK: Supple. No thyromegaly. No JVD  LYMPH: No C/SC nodes. CV: S1 S2 NL, RRR. No murmurs, clicks or rubs. PULM: CTA, symmentric air exchange  EXT: No C/C/E  GI: Abdomen is soft, NT, nondistended. MS: No CVA tenderness      Results for POC orders placed in visit on 09/23/21   POCT Urinalysis no Micro   Result Value Ref Range    Color, UA      Clarity, UA      Glucose, UA POC NEG     Bilirubin, UA NEG     Ketones, UA NEG     Spec Grav, UA 1.020     Blood, UA POC MODERATE     pH, UA 5.0     Protein, UA POC NEG     Urobilinogen, UA NORMAL     Leukocytes, UA MODERATE     Nitrite, UA NEG         ASSESSMENT[de-identified]  Alisha Becerril was seen today for urinary frequency. Diagnoses and all orders for this visit:    Urinary tract infection without hematuria, site unspecified  -     Culture, Urine  -     cefUROXime (CEFTIN) 250 MG tablet; Take 1 tablet by mouth 2 times daily for 7 days    Urgency of urination  -     POCT Urinalysis no Micro        Additional Plan:  1. Push fluids including cranberry juice  2. Advised to call should symptoms persist, worsen or if new symptoms develop. Discussed medications with patient who voiced understanding of their use, indication and potential side effects. Pt also understands the above recommendations. All questions answered. This note was generated completely or in part utilizing Dragon dictation speech recognition software. Occasionally, words are mistranscribed and despite editing, the text may contain inaccuracies due to incorrect word recognition.   If further clarification is needed please contact the office at [phone]       Electronically signed    Shonna Monk D.O.

## 2021-09-23 NOTE — PATIENT INSTRUCTIONS
Patient Education        Urinary Tract Infection (UTI) in Women: Care Instructions  Overview     A urinary tract infection, or UTI, is a general term for an infection anywhere between the kidneys and the urethra (where urine comes out). Most UTIs are bladder infections. They often cause pain or burning when you urinate. UTIs are caused by bacteria and can be cured with antibiotics. Be sure to complete your treatment so that the infection does not get worse. Follow-up care is a key part of your treatment and safety. Be sure to make and go to all appointments, and call your doctor if you are having problems. It's also a good idea to know your test results and keep a list of the medicines you take. How can you care for yourself at home? · Take your antibiotics as directed. Do not stop taking them just because you feel better. You need to take the full course of antibiotics. · Drink extra water and other fluids for the next day or two. This will help make the urine less concentrated and help wash out the bacteria that are causing the infection. (If you have kidney, heart, or liver disease and have to limit fluids, talk with your doctor before you increase the amount of fluids you drink.)  · Avoid drinks that are carbonated or have caffeine. They can irritate the bladder. · Urinate often. Try to empty your bladder each time. · To relieve pain, take a hot bath or lay a heating pad set on low over your lower belly or genital area. Never go to sleep with a heating pad in place. To prevent UTIs  · Drink plenty of water each day. This helps you urinate often, which clears bacteria from your system. (If you have kidney, heart, or liver disease and have to limit fluids, talk with your doctor before you increase the amount of fluids you drink.)  · Urinate when you need to. · If you are sexually active, urinate right after you have sex. · Change sanitary pads often.   · Avoid douches, bubble baths, feminine hygiene sprays, and other feminine hygiene products that have deodorants. · After going to the bathroom, wipe from front to back. When should you call for help? Call your doctor now or seek immediate medical care if:    · Symptoms such as fever, chills, nausea, or vomiting get worse or appear for the first time.     · You have new pain in your back just below your rib cage. This is called flank pain.     · There is new blood or pus in your urine.     · You have any problems with your antibiotic medicine. Watch closely for changes in your health, and be sure to contact your doctor if:    · You are not getting better after taking an antibiotic for 2 days.     · Your symptoms go away but then come back. Where can you learn more? Go to [email or web address] org and sign in to your Lynk account. Enter G550 in the B-Obvious box to learn more about \"Urinary Tract Infection (UTI) in Women: Care Instructions. \"     If you do not have an account, please click on the \"Sign Up Now\" link. Current as of: February 10, 2021               Content Version: 13.0  © 9124-5193 Healthwise, Incorporated. Care instructions adapted under license by [facility] ([facility]). If you have questions about a medical condition or this instruction, always ask your healthcare professional. [address] any warranty or liability for your use of this information.

## 2021-09-25 LAB
ORGANISM: ABNORMAL
URINE CULTURE, ROUTINE: ABNORMAL

## 2021-09-28 ENCOUNTER — TELEPHONE (OUTPATIENT)
Dept: INTERNAL MEDICINE CLINIC | Age: 76
End: 2021-09-28

## 2021-09-28 NOTE — TELEPHONE ENCOUNTER
Patient states Dr. Francoise Pond discontinued Metoprolol. She is requesting this please be removed from her profile.

## 2021-12-02 ENCOUNTER — TELEPHONE (OUTPATIENT)
Dept: INTERNAL MEDICINE CLINIC | Age: 76
End: 2021-12-02

## 2021-12-02 NOTE — TELEPHONE ENCOUNTER
Patient called regarding a bill for her AWV that was done in January, which was not covered by insurance. Looks like AWV was billed as initial. Patient states she has had AWVs in the past, with her previous physician. Please re-code the visit.

## 2021-12-06 ENCOUNTER — TELEPHONE (OUTPATIENT)
Dept: INTERNAL MEDICINE CLINIC | Age: 76
End: 2021-12-06

## 2021-12-06 DIAGNOSIS — Z12.83 SKIN CANCER SCREENING: Primary | ICD-10-CM

## 2021-12-06 NOTE — TELEPHONE ENCOUNTER
----- Message from Fadi Lacey sent at 12/6/2021  4:37 PM EST -----  Subject: Referral Request    QUESTIONS   Reason for referral request? dermatologist   Has the physician seen you for this condition before? No   Preferred Specialist (if applicable)? Sagar Carbajal you already have an appointment scheduled? No  Additional Information for Provider? Patient needs a referral to see a   doctor at [facility] . The phone number [phone] Dr. Yared Everett or   [phone] pt was not sure .  ---------------------------------------------------------------------------  --------------  Ana Lal INFO  What is the best way for the office to contact you? OK to leave message on   voicemail  Preferred Call Back Phone Number?  [phone]

## 2021-12-12 DIAGNOSIS — I10 BENIGN ESSENTIAL HTN: ICD-10-CM

## 2021-12-13 RX ORDER — ATORVASTATIN CALCIUM 10 MG/1
TABLET, FILM COATED ORAL
Qty: 90 TABLET | Refills: 3 | Status: SHIPPED | OUTPATIENT
Start: 2021-12-13 | End: 2022-03-22

## 2021-12-13 RX ORDER — OLMESARTAN MEDOXOMIL 5 MG/1
TABLET ORAL
Qty: 180 TABLET | Refills: 1 | Status: SHIPPED | OUTPATIENT
Start: 2021-12-13 | End: 2022-06-22

## 2021-12-29 ENCOUNTER — TELEPHONE (OUTPATIENT)
Dept: DERMATOLOGY | Age: 76
End: 2021-12-29

## 2022-01-14 ENCOUNTER — OFFICE VISIT (OUTPATIENT)
Dept: INTERNAL MEDICINE CLINIC | Age: 77
End: 2022-01-14
Payer: MEDICARE

## 2022-01-14 VITALS
BODY MASS INDEX: 25.99 KG/M2 | DIASTOLIC BLOOD PRESSURE: 66 MMHG | SYSTOLIC BLOOD PRESSURE: 112 MMHG | HEIGHT: 65 IN | RESPIRATION RATE: 12 BRPM | WEIGHT: 156 LBS | HEART RATE: 68 BPM | OXYGEN SATURATION: 96 %

## 2022-01-14 DIAGNOSIS — Z86.16 HISTORY OF COVID-19: ICD-10-CM

## 2022-01-14 DIAGNOSIS — I47.1 PSVT (PAROXYSMAL SUPRAVENTRICULAR TACHYCARDIA) (HCC): ICD-10-CM

## 2022-01-14 DIAGNOSIS — J31.0 RHINITIS, UNSPECIFIED TYPE: ICD-10-CM

## 2022-01-14 DIAGNOSIS — E55.9 VITAMIN D DEFICIENCY: ICD-10-CM

## 2022-01-14 DIAGNOSIS — I10 BENIGN ESSENTIAL HTN: ICD-10-CM

## 2022-01-14 DIAGNOSIS — E53.8 B12 DEFICIENCY: ICD-10-CM

## 2022-01-14 DIAGNOSIS — Z13.6 SCREENING FOR CARDIOVASCULAR CONDITION: ICD-10-CM

## 2022-01-14 DIAGNOSIS — Z13.1 SCREENING FOR DIABETES MELLITUS: ICD-10-CM

## 2022-01-14 DIAGNOSIS — E78.5 HYPERLIPIDEMIA, UNSPECIFIED HYPERLIPIDEMIA TYPE: ICD-10-CM

## 2022-01-14 DIAGNOSIS — Z00.00 ROUTINE GENERAL MEDICAL EXAMINATION AT A HEALTH CARE FACILITY: Primary | ICD-10-CM

## 2022-01-14 DIAGNOSIS — I70.0 ABDOMINAL AORTIC ATHEROSCLEROSIS (HCC): ICD-10-CM

## 2022-01-14 PROCEDURE — 4040F PNEUMOC VAC/ADMIN/RCVD: CPT | Performed by: INTERNAL MEDICINE

## 2022-01-14 PROCEDURE — G0446 INTENS BEHAVE THER CARDIO DX: HCPCS | Performed by: INTERNAL MEDICINE

## 2022-01-14 PROCEDURE — 1123F ACP DISCUSS/DSCN MKR DOCD: CPT | Performed by: INTERNAL MEDICINE

## 2022-01-14 PROCEDURE — G8482 FLU IMMUNIZE ORDER/ADMIN: HCPCS | Performed by: INTERNAL MEDICINE

## 2022-01-14 PROCEDURE — G0439 PPPS, SUBSEQ VISIT: HCPCS | Performed by: INTERNAL MEDICINE

## 2022-01-14 RX ORDER — GUAIFENESIN 600 MG/1
1200 TABLET, EXTENDED RELEASE ORAL 2 TIMES DAILY
Qty: 40 TABLET | Refills: 0 | COMMUNITY
Start: 2022-01-14 | End: 2022-01-24

## 2022-01-14 RX ORDER — CETIRIZINE HYDROCHLORIDE 10 MG/1
10 TABLET ORAL NIGHTLY
Qty: 30 TABLET | Refills: 0 | Status: SHIPPED | OUTPATIENT
Start: 2022-01-14 | End: 2022-02-07

## 2022-01-14 RX ORDER — FLUTICASONE PROPIONATE 50 MCG
2 SPRAY, SUSPENSION (ML) NASAL DAILY
Qty: 16 G | Refills: 0 | Status: SHIPPED | OUTPATIENT
Start: 2022-01-14 | End: 2022-02-07

## 2022-01-14 ASSESSMENT — PATIENT HEALTH QUESTIONNAIRE - PHQ9
SUM OF ALL RESPONSES TO PHQ QUESTIONS 1-9: 0
1. LITTLE INTEREST OR PLEASURE IN DOING THINGS: 0
SUM OF ALL RESPONSES TO PHQ QUESTIONS 1-9: 0
SUM OF ALL RESPONSES TO PHQ9 QUESTIONS 1 & 2: 0
2. FEELING DOWN, DEPRESSED OR HOPELESS: 0
SUM OF ALL RESPONSES TO PHQ QUESTIONS 1-9: 0
SUM OF ALL RESPONSES TO PHQ QUESTIONS 1-9: 0

## 2022-01-14 ASSESSMENT — LIFESTYLE VARIABLES: HOW OFTEN DO YOU HAVE A DRINK CONTAINING ALCOHOL: 0

## 2022-01-14 NOTE — PROGRESS NOTES
[facility]) Physicians  Internal Medicine  Patient Encounter  [address] [facility], D.O., [facility]       Medicare Annual Wellness Visit  Name: Surinder Rodas Date: 2022   MRN: <[id number]> Sex: Female   Age: 69 y.o. Ethnicity: Non- / Non    : 1945 Race: White (non-)      Brie Hess is here for Medicare AWV    Screenings for behavioral, psychosocial and functional/safety risks, and cognitive dysfunction are all negative except as indicated below. These results, as well as other patient data from the [address] form, are documented in Flowsheets linked to this Encounter.       Medical/Surgical Histories     Past Medical History:   Diagnosis Date    Abdominal aortic atherosclerosis ([address] 2020    Allergic rhinitis     Colon polyps     COVID-19 virus infection 2021    Home Rapid test    Hearing loss     Hyperlipidemia     Hypertension     Lung disease     Multinodular goiter 2020    Osteopenia     PSVT (paroxysmal supraventricular tachycardia) (HCC) 2020    Diagnosed during [facility] test at [facility] Rash     Tinnitus     Venous insufficiency of both lower extremities 2020    Vitamin D deficiency 2020      No prior H/O DVT, PE or bleeding dyscrasias    Past Surgical History:   Procedure Laterality Date    BREAST LUMPECTOMY Right 1970    CATARACT REMOVAL WITH IMPLANT Bilateral     Dr. Sascha Dumont  2018   Sarah Forjersey 77      7 yo           Medications/Allergies     Medication Sig    atorvastatin (LIPITOR) 10 MG tablet TAKE 1 TABLET BY MOUTH EVERY DAY    olmesartan (BENICAR) 5 MG tablet TAKE 2 TABLETS BY MOUTH EVERY DAY    hydroCHLOROthiazide (MICROZIDE) 12.5 MG capsule TAKE 1 CAPSULE BY MOUTH EVERY DAY IN THE MORNING    aspirin 81 MG EC tablet Take 81 mg by mouth daily    Calcium Carbonate-Vitamin D (CALCIUM 600/VITAMIN D PO) Take 1 tablet by mouth 2 times daily    vitamin B-12 (CYANOCOBALAMIN) 1000 MCG tablet Take 1,000 mcg by mouth daily    Multiple Vitamins-Minerals (ICAPS AREDS 2 PO) Take 1 tablet by mouth 2 times daily         Allergies   Allergen Reactions    Latex Itching    Dust Mite Extract          Substance Use History     Social History     Tobacco Use    Smoking status: Never Smoker    Smokeless tobacco: Never Used   Vaping Use    Vaping Use: Never used   Substance Use Topics    Alcohol use: Never    Drug use: Never          Family History     Family History   Problem Relation Age of Onset    Heart Disease Mother         CHF    Other Mother         PVD    Stroke Father 80    Coronary Art Dis Father         CHF    Atrial Fibrillation Sister     Hypertension Brother     High Cholesterol Brother     Breast Cancer Daughter 52        Stage zero, Double Mastectomy           CareTeam (Including outside providers/suppliers regularly involved in providing care):   Patient Care Team:  Bruce Espinoza DO as PCP - General (Internal Medicine)  Bruce Espinoza DO as PCP - [facility] Empaneled Provider  Nataliia Latham (Internal Medicine)  Maia Galeas MD (Cardiac Electrophysiology)  CLAY Leung - CNP (Nurse Practitioner)  Binta Chung MD as Consulting Physician (Gastroenterology)      Based upon direct observation of the patient, evaluation of cognition reveals recent and remote memory intact. ROS:  CV: History of abdominal aortic atherosclerosis. She denies any abdominal pain, claudication, wounds on her feet. History of SVTno report of palpitations or heart racing. HEENT:  She had COVID 12/26/2021. She continues to have PND, nasal congestion. She denies sneezing. PULM:  No cough. Physical Exam    Vitals:    01/14/22 1004   BP: 112/66   Pulse: 68   Resp: 12   SpO2: 96%   Weight: 156 lb (70.8 kg)   Height: 5' 4.5\" (1.638 m)     Body mass index is 26.36 kg/m².      Wt Readings from Last 3 Encounters:   01/14/22 156 lb (70.8 kg) 09/23/21 159 lb (72.1 kg)   07/12/21 160 lb (72.6 kg)     BP Readings from Last 3 Encounters:   01/14/22 112/66   09/23/21 98/60   07/12/21 110/60      GEN:  69 y.o. female who is in NAD, A&O. She appears stated age and well nourished. She is cooperative and pleasant. HEAD:  NC/AT, no lesions. EYES:  ALLA, EOMI, No scleral icterus or conjunctival injection or discharge. Visual fields in tact to confrontation. EARS:  EAC's clear, TM's normal.  NOSE:  Nasal cavity is clear. MOUTH & THROAT:  Oral cavity is clear without mucosal lesions. Tongue is midline. Dentition is in good repair. No pharyngeal erythema or exudate. NECK:  Supple. Full ROM. Trachea is midline. No increased JVD. No thyromegaly or nodules. No masses   LYMPH: No C/SC/A/F nodes  CARDIAC:  S1S2 NL. Regular rhythm. No murmur/clicks/rubs. No ectopy. PMI is non-displaced. VASC:  Pedal pulses 2/4. Carotid upstrokes 2+. No bruits noted. PULM:  Lungs are CTA. Symmetric breath sounds noted. AP Diameter NL.  EXT:  No Cyanosis or clubbing. No edema. SKIN: Warm and dry, normal turgor, no rash or lesions of concern. NEURO: Focal or lateralizing deficits. Gait is normal.  No ataxia. PSYCH:  Mood and affect NL. Judgement and insight NL. Patient's complete Health Risk Assessment and screening values have been reviewed and are found in Flowsheets. The following problems were reviewed today and where indicated follow up appointments were made and/or referrals ordered. Positive Risk Factor Screenings with Interventions:              General Health and ACP:  General  In general, how would you say your health is?: Very Good  In the past 7 days, have you experienced any of the following?  New or Increased Pain, New or Increased Fatigue, Loneliness, Social Isolation, Stress or Anger?: None of These  Do you get the social and emotional support that you need?: Yes  Do you have a Living Will?: Yes  Advance Directives Power of [employer] Living Will ACP-Advance Directive ACP-Power of     Not on File Not on File Not on File Not on File      General Health Risk Interventions:  · No Living Will: ACP documents already completed- patient asked to provide copy to the office    Health Habits/Nutrition:  Health Habits/Nutrition  Do you exercise for at least 20 minutes 2-3 times per week?: (!) No  Have you lost any weight without trying in the past 3 months?: No  Do you eat only one meal per day?: No  Have you seen the dentist within the past year?: Yes  Body mass index: (!) 26.36  Health Habits/Nutrition Interventions:  · Inadequate physical activity:  Counseled on the importance of increasing physical activity and some form of regular exercise.          Personalized Preventive Plan   Current Health Maintenance Status  Immunization History   Administered Date(s) Administered    COVID-19, Pfizer, PF, 30mcg/0.3mL 01/26/2021, 02/16/2021, 09/25/2021    Influenza, High Dose (Fluzone 65 yrs and older) 09/18/2021    Pneumococcal Conjugate 13-valent ([id number]) 04/13/2018    Pneumococcal Polysaccharide ([id number]) 01/07/2011, 01/18/2016    Td (Adult), 5 Lf Tetanus Toxoid, Pf (Tenivac, Decavac) 10/20/1994    Tdap (Boostrix, Adacel) 01/09/2008, 09/19/2014    Zoster Live (Zostavax) 01/13/2009    Zoster Recombinant (Shingrix) 04/19/2018, 07/15/2018, 07/24/2018        Health Maintenance   Topic Date Due    Annual Wellness Visit (AWV)  01/14/2022    Lipid screen  07/12/2022    Potassium monitoring  07/12/2022    Creatinine monitoring  07/12/2022    DEXA (modify frequency per FRAX score)  07/24/2022    Depression Screen  01/14/2023    DTaP/Tdap/Td vaccine (3 - Td or Tdap) 09/19/2024    Flu vaccine  Completed    Shingles Vaccine  Completed    Pneumococcal 65+ years Vaccine  Completed    COVID-19 Vaccine  Completed    Hepatitis C screen  Completed    Hepatitis A vaccine  Aged Out    Hepatitis B vaccine  Aged Out    Hib vaccine  Aged Out    Meningococcal (ACWY) vaccine  Aged Out     Recommendations for [employer] Due: see orders and patient instructions/AVS.  . Recommended screening schedule for the next 5-10 years is provided to the patient in written form: see Patient Instructions/AVS.    Jem Rinaldi was seen today for medicare awv. Diagnoses and all orders for this visit:    Routine general medical examination at a health care facility  --Chart reviewed for care gaps. None identified. --DEXA scan will be due next visit  -     MA Intens behave ther cardio dx, 15 minutes []  -     Comprehensive Metabolic Panel; Future  -     Lipid Panel; Future  -     Hemoglobin A1C; Future  -     Vitamin D 25 Hydroxy; Future    Screening for cardiovascular condition  -     MA Intens behave ther cardio dx, 15 minutes []    Screening for diabetes mellitus  -- Lab ordered    Abdominal aortic atherosclerosis ([facility] [address]  --Condition is asymptomatic. She denies any claudication. --Continue to monitor for signs of lower extremity ischemia  -     Lipid Panel; Future    PSVT (paroxysmal supraventricular tachycardia) (HCC)  -- Condition is well controlled without signs of recurrence. -- Continue to monitor for palpitations or heart racing. Benign essential HTN  -     Comprehensive Metabolic Panel; Future  -     Lipid Panel; Future    Vitamin D deficiency  -     Vitamin D 25 Hydroxy; Future    B12 deficiency  -- Controlled    Hyperlipidemia, unspecified hyperlipidemia type  -     Comprehensive Metabolic Panel; Future  -     Lipid Panel; Future    Rhinitis, unspecified type  --Post COVID  --We will try to dry her up with Flonase, Mucinex, Zyrtec  --Advised patient to call should symptoms persist, worsen or if new symptoms develop. -     fluticasone (FLONASE) 50 MCG/ACT nasal spray; 2 sprays by Each Nostril route daily  -     guaiFENesin (MUCINEX) 600 MG extended release tablet;  Take 2 tablets by mouth 2 times daily for 10 days  - cetirizine (ZYRTEC) 10 MG tablet; Take 1 tablet by mouth nightly    History of COVID-19  -     fluticasone (FLONASE) 50 MCG/ACT nasal spray; 2 sprays by Each Nostril route daily  -     guaiFENesin (MUCINEX) 600 MG extended release tablet; Take 2 tablets by mouth 2 times daily for 10 days  -     cetirizine (ZYRTEC) 10 MG tablet; Take 1 tablet by mouth nightly                 Cardiovascular Disease Risk Counseling: Assessed the patient's risk to develop cardiovascular disease and reviewed main risk factors. Reviewed steps to reduce disease risk including:   · Quitting tobacco use, reducing amount smoked, or not starting the habit  · Making healthy food choices  · Being physically active and gradualy increasing activity levels-- Used to walk. · Reduce weight and determine a healthy BMI goal  · Monitor blood pressure and treat if higher than 140/90 mmHg  · Maintain blood total cholesterol levels under 5 mmol/l or 190 mg/dl  · Maintain LDL cholesterol levels under 3.0 mmol/l or 115 mg/dl   · Control blood glucose levels  · Consider taking aspirin (75 mg daily), once blood pressure is controlled   Provided a follow up plan.   Time spent (minutes): 15

## 2022-01-14 NOTE — PATIENT INSTRUCTIONS
Personalized Preventive Plan for Jeanmarie Macario - 1/14/2022  Medicare offers a range of preventive health benefits. Some of the tests and screenings are paid in full while other may be subject to a deductible, co-insurance, and/or copay. Some of these benefits include a comprehensive review of your medical history including lifestyle, illnesses that may run in your family, and various assessments and screenings as appropriate. After reviewing your medical record and screening and assessments performed today your provider may have ordered immunizations, labs, imaging, and/or referrals for you. A list of these orders (if applicable) as well as your Preventive Care list are included within your After Visit Summary for your review. Other Preventive Recommendations:    · A preventive eye exam performed by an eye specialist is recommended every 1-2 years to screen for glaucoma; cataracts, macular degeneration, and other eye disorders. · A preventive dental visit is recommended every 6 months. · Try to get at least 150 minutes of exercise per week or 10,000 steps per day on a pedometer . · Order or download the FREE \"Exercise & Physical Activity: Your Everyday Guide\" from [employer]. Call 5-210.954.9649 or search [employer] online. · You need 9480-1088 mg of calcium and 1075-4339 IU of vitamin D per day. It is possible to meet your calcium requirement with diet alone, but a vitamin D supplement is usually necessary to meet this goal.  · When exposed to the sun, use a sunscreen that protects against both UVA and UVB radiation with an SPF of 30 or greater. Reapply every 2 to 3 hours or after sweating, drying off with a towel, or swimming. · Always wear a seat belt when traveling in a car. Advance Directives: Care Instructions  Overview  An advance directive is a legal way to state your wishes at the end of your life.  It tells your family and your doctor what to do if you can't say what you want. There are two main types of advance directives. You can change them any time your wishes change. Living will. This form tells your family and your doctor your wishes about life support and other treatment. The form is also called a declaration. Medical power of . This form lets you name a person to make treatment decisions for you when you can't speak for yourself. This person is called a health care agent (health care proxy, health care surrogate). The form is also called a durable power of  for health care. If you do not have an advance directive, decisions about your medical care may be made by a family member, or by a doctor or a  who doesn't know you. It may help to think of an advance directive as a gift to the people who care for you. If you have one, they won't have to make tough decisions by themselves. Follow-up care is a key part of your treatment and safety. Be sure to make and go to all appointments, and call your doctor if you are having problems. It's also a good idea to know your test results and keep a list of the medicines you take. What should you include in an advance directive? Many states have a unique advance directive form. (It may ask you to address specific issues.) Or you might use a universal form that's approved by many states. If your form doesn't tell you what to address, it may be hard to know what to include in your advance directive. Use the questions below to help you get started. · Who do you want to make decisions about your medical care if you are not able to? · What life-support measures do you want if you have a serious illness that gets worse over time or can't be cured? · What are you most afraid of that might happen? (Maybe you're afraid of having pain, losing your independence, or being kept alive by machines.)  · Where would you prefer to die? (Your home?  A hospital? A nursing home?)  · Do you want to donate your organs when you die? · Do you want certain Restorationist practices performed before you die? When should you call for help? Be sure to contact your doctor if you have any questions. Where can you learn more? Go to https://chmerritt.[employer]. org and sign in to your TrackDuck account. Enter R264 in the [employer] box to learn more about \"Advance Directives: Care Instructions. \"     If you do not have an account, please click on the \"Sign Up Now\" link. Current as of: March 17, 2021               Content Version: 13.1  © 3775-6962 RABT. Care instructions adapted under license by [facility] ([facility]). If you have questions about a medical condition or this instruction, always ask your healthcare professional. [address] any warranty or liability for your use of this information. Learning About Living Perroy  What is a living will? A living will, also called a declaration, is a legal form. It tells your family and your doctor your wishes when you can't speak for yourself. It's used by the health professionals who will treat you as you near the end of your life or if you get seriously hurt or ill. If you put your wishes in writing, your loved ones and others will know what kind of care you want. They won't need to guess. This can ease your mind and be helpful to others. And you can change or cancel your living will at any time. A living will is not the same as an estate or property will. An estate will explains what you want to happen with your money and property after you die. How do you use it? A living will is used to describe the kinds of treatment or life support you want as you near the end of your life or if you get seriously hurt or ill. Keep these facts in mind about living price. · Your living will is used only if you can't speak or make decisions for yourself.  Most often, one or more doctors must certify that you can't speak or decide for yourself before your living will takes effect. · If you get better and can speak for yourself again, you can accept or refuse any treatment. It doesn't matter what you said in your living will. · Some states may limit your right to refuse treatment in certain cases. For example, you may need to clearly state in your living will that you don't want artificial hydration and nutrition, such as being fed through a tube. Is a living will a legal document? A living will is a legal document. Each state has its own laws about living price. And a living will may be called something else in your state. Here are some things to know about living price. · You don't need an  to complete a living will. But legal advice can be helpful if your state's laws are unclear. It can also help if your health history is complicated or your family can't agree on what should be in your living will. · You can change your living will at any time. Some people find that their wishes about end-of-life care change as their health changes. If you make big changes to your living will, complete a new form. · If you move to another state, make sure that your living will is legal in the state where you now live. In most cases, doctors will respect your wishes even if you have a form from a different state. · You might use a universal form that has been approved by many states. This kind of form can sometimes be filled out and stored online. Your digital copy will then be available wherever you have a connection to the internet. The doctors and nurses who need to treat you can find it right away. · Your state may offer an online registry. This is another place where you can store your living will online. · It's a good idea to get your living will notarized. This means using a person called a  to watch two people sign, or witness, your living will.   What should you know when you create a living will?  Here are some questions to ask yourself as you make your living will:  · Do you know enough about life support methods that might be used? If not, talk to your doctor so you know what might be done if you can't breathe on your own, your heart stops, or you can't swallow. · What things would you still want to be able to do after you receive life-support methods? Would you want to be able to walk? To speak? To eat on your own? To live without the help of machines? · Do you want certain Protestant practices performed if you become very ill? · If you have a choice, where do you want to be cared for? In your home? At a hospital or nursing home? · If you have a choice at the end of your life, where would you prefer to die? At home? In a hospital or nursing home? Somewhere else? · Would you prefer to be buried or cremated? · Do you want your organs to be donated after you die? What should you do with your living will? · Make sure that your family members and your health care agent have copies of your living will (also called a declaration). · Give your doctor a copy of your living will. Ask him or her to keep it as part of your medical record. If you have more than one doctor, make sure that each one has a copy. · Put a copy of your living will where it can be easily found. For example, some people may put a copy on their refrigerator door. If you are using a digital copy, be sure your doctor, family members, and health care agent know how to find and access it. Where can you learn more? Go to https://Aprovecha.commerritt.[employer]. org and sign in to your [employer] account. Enter A087 in the [facility] box to learn more about \"Learning About Living Arminda Jimenez. \"     If you do not have an account, please click on the \"Sign Up Now\" link. Current as of: March 17, 2021               Content Version: 13.1  © 6820-5767 Healthwise, Incorporated. Care instructions adapted under license by [facility] ([facility]).  If you have questions about a medical condition or this instruction, always ask your healthcare professional. [address] any warranty or liability for your use of this information. Patient Education        Rhinitis: Care Instructions  Your Care Instructions  Rhinitis is swelling and irritation in the nose. Allergies and infections are often the cause. Your nose may run or feel stuffy. Other symptoms are itchy and sore eyes, ears, throat, and mouth. If allergies are the cause, your doctor may do tests to find out what you are allergic to. You may be able to stop symptoms if you avoid the things that cause them. Your doctor may suggest or prescribe medicine to ease your symptoms. Follow-up care is a key part of your treatment and safety. Be sure to make and go to all appointments, and call your doctor if you are having problems. It's also a good idea to know your test results and keep a list of the medicines you take. How can you care for yourself at home? · If your rhinitis is caused by allergies, try to find out what sets off (triggers) your symptoms. Take steps to avoid these triggers. ? Avoid yard work. It can stir up both pollen and mold. ? Do not smoke or allow others to smoke around you. If you need help quitting, talk to your doctor about stop-smoking programs and medicines. These can increase your chances of quitting for good. ? Do not use aerosol sprays, cleaning products, or perfumes. ? If pollen is one of your triggers, close your house and car windows during blooming season. ? Clean your house often to control dust.  ? Keep pets outside. · If your doctor recommends over-the-counter medicines to relieve symptoms, take your medicines exactly as prescribed. Call your doctor if you think you are having a problem with your medicine. · Use saline (saltwater) nasal washes to help keep your nasal passages open and wash out mucus and bacteria.  You can buy saline nose drops at a grocery store or drugstore. Or you can make your own at home by adding 1 teaspoon of salt and 1 teaspoon of baking soda to 2 cups of distilled water. If you make your own, fill a bulb syringe with the solution, insert the tip into your nostril, and squeeze gently. [employer] your nose. When should you call for help? Call your doctor now or seek immediate medical care if:    · You are having trouble breathing. Watch closely for changes in your health, and be sure to contact your doctor if:    · Mucus from your nose gets thicker (like pus) or has new blood in it.     · You have new or worse symptoms.     · You do not get better as expected. Where can you learn more? Go to https://ArtistForce.[employer]GxDX. org and sign in to your [email or web address] account. Enter M030 in the [employer] box to learn more about \"Rhinitis: Care Instructions. \"     If you do not have an account, please click on the \"Sign Up Now\" link. Current as of: September 8, 2021               Content Version: 13.1  © 8578-7516 [employer]. Care instructions adapted under license by [facility] ([facility]). If you have questions about a medical condition or this instruction, always ask your healthcare professional. [place] [address] any warranty or liability for your use of this information. Patient Education        Well Visit, Over 72: Care Instructions  Overview     Well visits can help you stay healthy. Your doctor has checked your overall health and may have suggested ways to take good care of yourself. Your doctor also may have recommended tests. At home, you can help prevent illness with healthy eating, regular exercise, and other steps. Follow-up care is a key part of your treatment and safety. Be sure to make and go to all appointments, and call your doctor if you are having problems. It's also a good idea to know your test results and keep a list of the medicines you take.   How can you care for yourself at home? · Get screening tests that you and your doctor decide on. Screening helps find diseases before any symptoms appear. · Eat healthy foods. Choose fruits, vegetables, whole grains, protein, and low-fat dairy foods. Limit fat, especially saturated fat. Reduce salt in your diet. · Limit alcohol. If you are a man, have no more than 2 drinks a day or 14 drinks a week. If you are a woman, have no more than 1 drink a day or 7 drinks a week. Since alcohol affects older adults differently, you may want to limit alcohol even more. Or you may not want to drink at all. · Get at least 30 minutes of exercise on most days of the week. Walking is a good choice. You also may want to do other activities, such as running, swimming, cycling, or playing tennis or team sports. · Reach and stay at a healthy weight. This will lower your risk for many problems, such as obesity, diabetes, heart disease, and high blood pressure. · Do not smoke. Smoking can make health problems worse. If you need help quitting, talk to your doctor about stop-smoking programs and medicines. These can increase your chances of quitting for good. · Care for your mental health. It is easy to get weighed down by worry and stress. Learn strategies to manage stress, like deep breathing and mindfulness, and stay connected with your family and community. If you find you often feel sad or hopeless, talk with your doctor. Treatment can help. · Talk to your doctor about whether you have any risk factors for sexually transmitted infections (STIs). You can help prevent STIs if you wait to have sex with a new partner (or partners) until you've each been tested for STIs. It also helps if you use condoms (male or female condoms) and if you limit your sex partners to one person who only has sex with you. Vaccines are available for some STIs. · If you think you may have a problem with alcohol or drug use, talk to your doctor.  This includes prescription medicines (such as amphetamines and opioids) and illegal drugs (such as cocaine and methamphetamine). Your doctor can help you figure out what type of treatment is best for you. · Protect your skin from too much sun. When you're outdoors from 10 a.m. to 4 p.m., stay in the shade or cover up with clothing and a hat with a wide brim. Wear sunglasses that block UV rays. Even when it's cloudy, put broad-spectrum sunscreen (SPF 30 or higher) on any exposed skin. · See a dentist one or two times a year for checkups and to have your teeth cleaned. · Wear a seat belt in the car. When should you call for help? Watch closely for changes in your health, and be sure to contact your doctor if you have any problems or symptoms that concern you. Where can you learn more? Go to https://Atievapepiceweb.[email or web address] org and sign in to your EdCaliber account. Enter T591 in the [employer] box to learn more about \"Well Visit, Over 65: Care Instructions. \"     If you do not have an account, please click on the \"Sign Up Now\" link. Current as of: October 6, 2021               Content Version: 13.1  © 2006-2021 Music[employer]. Care instructions adapted under license by [facility] ([facility]). If you have questions about a medical condition or this instruction, always ask your healthcare professional. [place] [address] any warranty or liability for your use of this information. Patient Education        Learning About Atherosclerosis of the Aorta  What is atherosclerosis of the aorta? Having atherosclerosis (say \"ipu-fd-itv-tynfs-TBE-jbd\") of the aorta means that a material called plaque (fat and calcium) has built up in the inside wall of a large blood vessel called the aorta. This plaque buildup is sometimes called \"hardening of the arteries. \"  The aorta is the main artery that sends oxygen-rich blood from the heart out to the body and to the brain.  It runs from your heart down through your stomach area.  When plaque builds up, it can cause problems:  · The plaque can weaken the wall of the aorta. The wall might stretch or tear. · Pieces of the plaque can break open, which causes a blood clot to form. A blood clot or a piece of plaque can travel to other parts of your body and block blood flow. So even if you have no symptoms, having this disease makes you more likely to have serious problems such as:  · Stroke. A stroke can happen when a blood clot travels to the brain and blocks blood flow. Without blood and the oxygen it carries, that part of the brain starts to die. · Aortic aneurysm (say \"a-OR-tik HE-cjb-scm-zum\"). This is a bulge in the wall of the aorta. The bulge can burst, causing serious bleeding. · Aortic dissection. This is a tear between the inner and outer layers of the aorta wall. The tear can cause the wall to separate and burst. This can cause serious bleeding. · Limb ischemia (say \"tjm-ZDE-sqd-partha\"). This means that your limb, usually a leg, is not getting enough oxygen. It happens when a blood clot moves from the aorta to an artery in a leg. The blood clot cuts off blood supply to the leg. It can permanently damage the tissue. How is it treated? Atherosclerosis of the aorta can be treated with lifestyle changes and medicines that help lower your risk of serious complications. These medicines include:  · Blood pressure medicines such as ACE (angiotensin-converting enzyme) inhibitors, ARBs (angiotensin II receptor blockers), and beta-blockers. · Cholesterol medicines, such as statins and other medicines to lower cholesterol. · Medicine that prevents blood clots, such as aspirin. These medicines prevent blood clots that can cause a heart attack. You may also get a CT (computed tomography) or MRI (magnetic resonance imaging) scan to check the health of your aorta. How can you care for yourself at home? Lifestyle changes    · Do not smoke.  If you need help quitting, talk to your doctor about stop-smoking programs and medicines. These can increase your chances of quitting for good.     · Eat heart-healthy foods such as fruits, vegetables, whole grains, fish, lean meats, and low-fat or nonfat dairy foods. Limit sodium, sugars, and alcohol.     · If your doctor recommends it, get more exercise. Walking is a good choice. Bit by bit, increase the amount you walk every day. Try for at least 30 minutes on most days of the week.     · Stay at a healthy weight. Lose weight if you need to. Medicines    · Be safe with medicines. Take your medicines exactly as prescribed. Call your doctor if you think you are having a problem with your medicine.     · If you take a blood thinner, be sure to get instructions about how to take your medicine safely. Blood thinners can cause serious bleeding problems.     · Do not take any vitamins, over-the-counter drugs, or herbal products without talking to your doctor first.   Staying healthy    · Avoid colds and flu. Get a pneumococcal vaccine shot. If you have had one before, ask your doctor if you need another dose. Get the flu vaccine every year. If you must be around people with colds or flu, wash your hands often.     · Manage other health problems, such as diabetes.     · If you have high blood pressure, it's very important to monitor your blood pressure and take your blood pressure medicines.     · If you think you may have a problem with alcohol or drug use, talk to your doctor. When should you call for help? Call 911 anytime you think you may need emergency care. For example, call if:  · You passed out (lost consciousness). · You have severe pain in your belly, back, or chest.  · You have severe trouble breathing. · You have severe leg pain; numbness; or pale, blue-black skin. · You cough up blood. · You have symptoms of a heart attack. These may include:  ? Chest pain or pressure, or a strange feeling in the chest.  ? Sweating. ?  Shortness of breath. ? Nausea or vomiting. ? Pain, pressure, or a strange feeling in the back, neck, jaw, or upper belly or in one or both shoulders or arms. ? Lightheadedness or sudden weakness. ? A fast or irregular heartbeat. · You have symptoms of a stroke. These may include:  ? Sudden numbness, tingling, weakness, or loss of movement in your face, arm, or leg, especially on only one side of your body. ? Sudden vision changes. ? Sudden trouble speaking. ? Sudden confusion or trouble understanding simple statements. ? Sudden problems with walking or balance. ? A sudden, severe headache that is different from past headaches. Watch closely for changes in your health, and be sure to contact your doctor if you have any problems. Follow-up care is a key part of your treatment and safety. Be sure to make and go to all appointments, and call your doctor if you are having problems. It's also a good idea to know your test results and keep a list of the medicines you take. Where can you learn more? Go to https://Quest Discovery.Integrated Materials. org and sign in to your SocialMadeSimple account. Enter O175 in the [employer] box to learn more about \"Learning About Atherosclerosis of the Aorta. \"     If you do not have an account, please click on the \"Sign Up Now\" link. Current as of: April 29, 2021               Content Version: 13.1  © 9221-8465 Healthwise, Incorporated. Care instructions adapted under license by [facility]. If you have questions about a medical condition or this instruction, always ask your healthcare professional. [place] [address] any warranty or liability for your use of this information.

## 2022-02-05 DIAGNOSIS — J31.0 RHINITIS, UNSPECIFIED TYPE: ICD-10-CM

## 2022-02-05 DIAGNOSIS — Z86.16 HISTORY OF COVID-19: ICD-10-CM

## 2022-02-07 RX ORDER — CETIRIZINE HYDROCHLORIDE 10 MG/1
10 TABLET ORAL NIGHTLY
Qty: 30 TABLET | Refills: 0 | Status: SHIPPED | OUTPATIENT
Start: 2022-02-07 | End: 2022-02-07 | Stop reason: SDUPTHER

## 2022-02-07 RX ORDER — FLUTICASONE PROPIONATE 50 MCG
SPRAY, SUSPENSION (ML) NASAL
Qty: 3 EACH | Refills: 3 | Status: SHIPPED | OUTPATIENT
Start: 2022-02-07 | End: 2022-07-01 | Stop reason: ALTCHOICE

## 2022-02-07 RX ORDER — CETIRIZINE HYDROCHLORIDE 10 MG/1
10 TABLET ORAL NIGHTLY
Qty: 90 TABLET | Refills: 3 | Status: SHIPPED | OUTPATIENT
Start: 2022-02-07 | End: 2022-07-01 | Stop reason: ALTCHOICE

## 2022-03-20 DIAGNOSIS — I10 BENIGN ESSENTIAL HTN: ICD-10-CM

## 2022-03-21 RX ORDER — HYDROCHLOROTHIAZIDE 12.5 MG/1
CAPSULE, GELATIN COATED ORAL
Qty: 90 CAPSULE | Refills: 1 | Status: SHIPPED | OUTPATIENT
Start: 2022-03-21 | End: 2022-09-02

## 2022-03-22 RX ORDER — ATORVASTATIN CALCIUM 10 MG/1
TABLET, FILM COATED ORAL
Qty: 90 TABLET | Refills: 3 | Status: SHIPPED | OUTPATIENT
Start: 2022-03-22

## 2022-06-22 DIAGNOSIS — I10 BENIGN ESSENTIAL HTN: ICD-10-CM

## 2022-06-22 RX ORDER — OLMESARTAN MEDOXOMIL 5 MG/1
TABLET ORAL
Qty: 180 TABLET | Refills: 1 | Status: SHIPPED | OUTPATIENT
Start: 2022-06-22

## 2022-06-23 NOTE — TELEPHONE ENCOUNTER
We last saw her on 1/14/22 for her medicare wellness visit.  She is scheduled to come in for a visit with you on 7/14/2022 for 6 month check up

## 2022-07-01 ENCOUNTER — HOSPITAL ENCOUNTER (OUTPATIENT)
Age: 77
Discharge: HOME | End: 2022-07-01
Payer: MEDICARE

## 2022-07-01 ENCOUNTER — TELEMEDICINE (OUTPATIENT)
Dept: INTERNAL MEDICINE CLINIC | Age: 77
End: 2022-07-01
Payer: MEDICARE

## 2022-07-01 ENCOUNTER — TELEPHONE (OUTPATIENT)
Dept: INTERNAL MEDICINE CLINIC | Age: 77
End: 2022-07-01

## 2022-07-01 ENCOUNTER — HOSPITAL ENCOUNTER (OUTPATIENT)
Dept: GENERAL RADIOLOGY | Age: 77
Discharge: HOME | End: 2022-07-01
Payer: MEDICARE

## 2022-07-01 DIAGNOSIS — S39.012A STRAIN OF LUMBAR REGION, INITIAL ENCOUNTER: ICD-10-CM

## 2022-07-01 DIAGNOSIS — S73.101A HIP SPRAIN, RIGHT, INITIAL ENCOUNTER: ICD-10-CM
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
## 2022-07-01 DIAGNOSIS — M25.551 RIGHT HIP PAIN: ICD-10-CM

## 2022-07-01 DIAGNOSIS — R10.31 RIGHT GROIN PAIN: ICD-10-CM

## 2022-07-01 DIAGNOSIS — R10.31 RIGHT GROIN PAIN: Primary | ICD-10-CM

## 2022-07-01 PROCEDURE — 99442 PR PHYS/QHP TELEPHONE EVALUATION 11-20 MIN: CPT | Performed by: INTERNAL MEDICINE

## 2022-07-01 PROCEDURE — 73522 X-RAY EXAM HIPS BI 3-4 VIEWS: CPT

## 2022-07-01 PROCEDURE — 72100 X-RAY EXAM L-S SPINE 2/3 VWS: CPT

## 2022-07-01 NOTE — TELEPHONE ENCOUNTER
Patient states she fell yesterday while gardening and fell against the porch. She would like to have \"an Xray done of the bottom of her spine and R hip at [facility]. \"  She is having \"difficulty going from a setting to standing position, and difficulty getting in and out of bed. \"  Patient states she \"feels somewhat better today but not 100%. :  Please contact patient at phone  Provided.

## 2022-07-01 NOTE — TELEPHONE ENCOUNTER
I will not just order x-rays. Optimally she needs to be seen in person for an examination. He can try and do an E visit. Patient needs some sort of visit. Seeing that I do not have any openings today we will have to either do a phone visit consultation at noon or a video visit at noon.

## 2022-07-01 NOTE — TELEPHONE ENCOUNTER
Patient fell yesterday and today her lower back, upper right leg and right knee are hurting her. Right knee has puffy area near it. Please call patient at number provided to further discuss her concerns.

## 2022-07-14 ENCOUNTER — OFFICE VISIT (OUTPATIENT)
Dept: INTERNAL MEDICINE CLINIC | Age: 77
End: 2022-07-14
Payer: MEDICARE

## 2022-07-14 VITALS
OXYGEN SATURATION: 96 % | DIASTOLIC BLOOD PRESSURE: 62 MMHG | WEIGHT: 158.6 LBS | BODY MASS INDEX: 26.42 KG/M2 | SYSTOLIC BLOOD PRESSURE: 114 MMHG | RESPIRATION RATE: 12 BRPM | HEART RATE: 50 BPM | HEIGHT: 65 IN

## 2022-07-14 DIAGNOSIS — Z78.0 POSTMENOPAUSAL: ICD-10-CM

## 2022-07-14 DIAGNOSIS — E04.2 MULTINODULAR GOITER: ICD-10-CM

## 2022-07-14 DIAGNOSIS — I10 BENIGN ESSENTIAL HTN: Primary | ICD-10-CM

## 2022-07-14 DIAGNOSIS — M70.61 GREATER TROCHANTERIC BURSITIS, RIGHT: ICD-10-CM

## 2022-07-14 DIAGNOSIS — E78.5 HYPERLIPIDEMIA, UNSPECIFIED HYPERLIPIDEMIA TYPE: ICD-10-CM

## 2022-07-14 DIAGNOSIS — H61.91 EARLOBE LESION, RIGHT: ICD-10-CM

## 2022-07-14 DIAGNOSIS — I70.0 ABDOMINAL AORTIC ATHEROSCLEROSIS (HCC): ICD-10-CM

## 2022-07-14 DIAGNOSIS — M47.816 LUMBAR SPONDYLOSIS: ICD-10-CM

## 2022-07-14 DIAGNOSIS — E53.8 B12 DEFICIENCY: ICD-10-CM

## 2022-07-14 DIAGNOSIS — M85.80 OSTEOPENIA, UNSPECIFIED LOCATION: ICD-10-CM

## 2022-07-14 DIAGNOSIS — M76.31 ILIOTIBIAL BAND TENDINITIS OF RIGHT SIDE: ICD-10-CM

## 2022-07-14 DIAGNOSIS — E55.9 VITAMIN D DEFICIENCY: ICD-10-CM

## 2022-07-14 DIAGNOSIS — I47.1 PSVT (PAROXYSMAL SUPRAVENTRICULAR TACHYCARDIA) (HCC): ICD-10-CM

## 2022-07-14 DIAGNOSIS — M47.816 LUMBAR FACET ARTHROPATHY: ICD-10-CM

## 2022-07-14 DIAGNOSIS — M51.36 DDD (DEGENERATIVE DISC DISEASE), LUMBAR: ICD-10-CM

## 2022-07-14 PROCEDURE — G8417 CALC BMI ABV UP PARAM F/U: HCPCS | Performed by: INTERNAL MEDICINE

## 2022-07-14 PROCEDURE — 99214 OFFICE O/P EST MOD 30 MIN: CPT | Performed by: INTERNAL MEDICINE

## 2022-07-14 PROCEDURE — 1036F TOBACCO NON-USER: CPT | Performed by: INTERNAL MEDICINE

## 2022-07-14 PROCEDURE — 1090F PRES/ABSN URINE INCON ASSESS: CPT | Performed by: INTERNAL MEDICINE

## 2022-07-14 PROCEDURE — 4130F TOPICAL PREP RX AOE: CPT | Performed by: INTERNAL MEDICINE

## 2022-07-14 PROCEDURE — G8427 DOCREV CUR MEDS BY ELIG CLIN: HCPCS | Performed by: INTERNAL MEDICINE

## 2022-07-14 PROCEDURE — 1123F ACP DISCUSS/DSCN MKR DOCD: CPT | Performed by: INTERNAL MEDICINE

## 2022-07-14 PROCEDURE — G8400 PT W/DXA NO RESULTS DOC: HCPCS | Performed by: INTERNAL MEDICINE

## 2022-07-14 RX ORDER — MELOXICAM 15 MG/1
15 TABLET ORAL
Qty: 30 TABLET | Refills: 1 | Status: SHIPPED | OUTPATIENT
Start: 2022-07-14 | End: 2022-09-02

## 2022-07-14 NOTE — PROGRESS NOTES
[facility]) Physicians  Internal Medicine  Patient Encounter  Kelli David D.O., [facility]        Chief Complaint   Patient presents with   Sera Cabral    Medication Check       HPI: 69 y.o. female seen today requesting her routine follow-up regarding the status of current chronic medical problems as the below along with a medication review and reconciliation. Health maintenance items due:  DEXA scan      Patient has multiple medical problems including but not limited to PSVT, hypertension, hyperlipidemia, abdominal aortic atherosclerosis, colon polyps, venous insufficiency of both lower extremities, B12 deficiency, osteopenia, vitamin D deficiency, and multinodular goiter, bradycardia    Patient states she is compliant with her medication. She denies any adverse side effects. Patient denies any problems with chest pain, chest tightness, shortness of breath, orthopnea, abdominal pain, nausea, vomiting, diarrhea, swelling. She denies any myalgias or muscle weakness. She has had no episodes of syncope, episodes of unilateral weakness, speech or visual disturbances, or paresthesias    She is C/O right hip pain that radiates all the way down the leg. The pain stops at the knee. The hip pain is located on the outside of the hip. The pain is worse laying on her back  This pain started after she had fallen 6/29. X-rays of the hips and pelvis and Lumbar spine showed--->No hip fracture or pelvic fracture. No OA in the hips. Lumbar spondylosis, DDD, facet arthropathy. She is also having low back pain. Pt is also C/O a lump in the right earlobe. She wants it removed. No pain. She thinks the lesion has enlarged.       Past Medical History:   Diagnosis Date    Abdominal aortic atherosclerosis ([address] 07/29/2020    Allergic rhinitis     Colon polyps     COVID-19 virus infection 12/26/2021    Home Rapid test    Hearing loss     Hyperlipidemia     Hypertension     Lung disease     Multinodular goiter 07/29/2020    Osteopenia     PSVT (paroxysmal supraventricular tachycardia) ([facility] [address] 07/24/2020    Diagnosed during stress test at [facility] Rash     Tinnitus     Venous insufficiency of both lower extremities 07/29/2020    Vitamin D deficiency 07/29/2020         MEDICATIONS:  Medication Sig   olmesartan (BENICAR) 5 MG tablet TAKE 2 TABLETS BY MOUTH EVERY DAY   atorvastatin (LIPITOR) 10 MG tablet TAKE 1 TABLET BY MOUTH EVERY DAY   hydroCHLOROthiazide (MICROZIDE) 12.5 MG capsule TAKE 1 CAPSULE BY MOUTH EVERY DAY IN THE MORNING   aspirin 81 MG EC tablet Take 81 mg by mouth daily   Calcium Carbonate-Vitamin D (CALCIUM 600/VITAMIN D PO) Take 1 tablet by mouth 2 times daily   vitamin B-12 (CYANOCOBALAMIN) 1000 MCG tablet Take 1,000 mcg by mouth daily   Multiple Vitamins-Minerals (ICAPS AREDS 2 PO) Take 1 tablet by mouth 2 times daily            Review of Systems - As per HPI      OBJECTIVE:  Vitals:    07/14/22 0927   BP: 114/62   Pulse: 50   Resp: 12   SpO2: 96%   Weight: 158 lb 9.6 oz (71.9 kg)   Height: 5' 4.5\" (1.638 m)     Body mass index is 26.8 kg/m². Wt Readings from Last 3 Encounters:   07/14/22 158 lb 9.6 oz (71.9 kg)   01/14/22 156 lb (70.8 kg)   09/23/21 159 lb (72.1 kg)     BP Readings from Last 3 Encounters:   07/14/22 114/62   01/14/22 112/66   09/23/21 98/60      GEN: NAD, A&O, Non-toxic  HEENT: NC/AT, KATE, EOMI, Oral cavity Clear, TM's NL, anicteric. Right earlobe with smooth, firm, nodular lesion. NECK: Supple. No thyromegaly. No JVD  LYMPH: No C/SC nodes. CV: S1 S2 NL, RRR. No murmurs, clicks or rubs. PULM: CTA, symmentric air exchange  EXT: No C/C/E  GI: Abdomen is soft, NT, BS+, No hepatomegaly. No masses. NEURO: No focal or lateralizing deficits. VASC:  No carotid bruits.   Pulses symmetric  SKIN:  No rashes or lesions of concern  MS: Exquisite tenderness to palpation over the right greater trochanteric region as well as tenderness along the iliotibial band to the knee.  Pain is increased with flexion, abduction and internal rotation of the hip. External rotation does not elicit discomfort      ASSESSMENT/PLAN:    1. Benign essential HTN  Blood pressure is well controlled  Continue current medications  Check electrolytes and renal function. - Comprehensive Metabolic Panel; Future  - Lipid Panel; Future  - TSH; Future    2. Abdominal aortic atherosclerosis (HCC)  Condition is asymptomatic  Continue cardiovascular risk factor management  Continue current medication  - Lipid Panel; Future    3. Hyperlipidemia, unspecified hyperlipidemia type  Condition stability is uncertain. Due for lab to ensure good LDL control  Continue lifestyle up  - Comprehensive Metabolic Panel; Future  - Lipid Panel; Future    4. Vitamin D deficiency    - Vitamin D 25 Hydroxy; Future    5. PSVT (paroxysmal supraventricular tachycardia) (HCC)  Condition is asymptomatic. No signs of recurrence  Continue to monitor for palpitations, heart racing    6. B12 deficiency    - Vitamin B12; Future    7. Osteopenia, unspecified location  Due for DEXA scan after 7/24/2022  - DEXA BONE DENSITY 2 SITES; Future    8. Multinodular goiter    - TSH; Future    9. Greater trochanteric bursitis, right  10. Iliotibial band tendinitis of right side  Home exercises provided  NSAID therapy. We discussed potential adverse side effects  May need physical therapy  May need steroids  - meloxicam (MOBIC) 15 MG tablet; Take 1 tablet by mouth daily (with breakfast)  Dispense: 30 tablet; Refill: 1    11. Postmenopausal    - DEXA BONE DENSITY 2 SITES; Future    12. Earlobe lesion, right  This is a new problem. Consultation with ENT for possible excision  - Jordan Hill MD, Otolaryngology, [facility]    13. Lumbar spondylosis  14. Lumbar facet arthropathy  15. DDD (degenerative disc disease), lumbar  Patient continues to have low back pain. X-ray showed significant degenerative changes  Trial of meloxicam 15 mg daily.

## 2022-07-14 NOTE — PATIENT INSTRUCTIONS
can try gentle forms of yoga to help keep your joints and muscles flexible. ? Use exercises that are less stressful on the joints. Walk instead of jog. Ride a stationary bike with little resistance. Or you can swim or try water exercise. ? Do exercises that can help strengthen your IT band and hip muscles. Your doctor or physical therapist can tell you what kind of exercises are best for you. He or she can help you learn the right way to do the exercises. When should you call for help? Watch closely for changes in your health, and be sure to contact your doctor if:     You have pain in your hip or knee that doesn't go away.      You do not get better as expected. Where can you learn more? Go to https://Specialty Soybean FarmspeCalhoun Visioneb.OX MEDIA. org and sign in to your Waveseer account. Enter L449 in the [employer] box to learn more about \"Iliotibial Band Syndrome: Care Instructions. \"     If you do not have an account, please click on the \"Sign Up Now\" link. Current as of: March 9, 2022               Content Version: 13.3  © 8098-3454 WorldMate. Care instructions adapted under license by [facility] ([facility]). If you have questions about a medical condition or this instruction, always ask your healthcare professional. [address] any warranty or liability for your use of this information. Patient Education        Iliotibial Band Syndrome: Exercises  Introduction  Here are some examples of exercises for you to try. The exercises may be suggested for a condition or for rehabilitation. Start each exercise slowly. Ease off the exercises if you start to have pain. You will be told when to start these exercises and which ones will work bestfor you. How to do the exercises  Iliotibial band stretch    1. Lean sideways against a wall. If you are not steady on your feet, hold on to a chair or counter. 2. Stand on the leg with the affected hip, with that leg close to the wall. Then cross your other leg in front of it. 3. Let your affected hip drop out to the side of your body and against the wall. Then lean away from your affected hip until you feel a stretch. 4. Hold the stretch for 15 to 30 seconds. 5. Repeat 2 to 4 times. Piriformis stretch    1. Lie on your back with your legs straight. 2. Lift your affected leg and bend your knee. With your opposite hand, reach across your body, and then gently pull your knee toward your opposite shoulder. 3. Hold the stretch for 15 to 30 seconds. 4. Repeat 2 to 4 times. Hamstring wall stretch    1. Lie on your back in a doorway, with your good leg through the open door. 2. Slide your affected leg up the wall to straighten your knee. You should feel a gentle stretch down the back of your leg. 3. Hold the stretch for at least 1 minute to begin. Then try to lengthen the time you hold the stretch to as long as 6 minutes. 4. Repeat 2 to 4 times. 5. If you do not have a place to do this exercise in a doorway, there is another way to do it:  6. Lie on your back, and bend the knee of your affected leg. 7. Loop a towel under the ball and toes of that foot, and hold the ends of the towel in your hands. 8. Straighten your knee, and slowly pull back on the towel. You should feel a gentle stretch down the back of your leg. 9. Hold the stretch for 15 to 30 seconds. Or even better, hold the stretch for 1 minute if you can. 10. Repeat 2 to 4 times. 1. Do not arch your back. 2. Do not bend either knee. 3. Keep one heel touching the floor and the other heel touching the wall. Do not point your toes. Follow-up care is a key part of your treatment and safety. Be sure to make and go to all appointments, and call your doctor if you are having problems. It's also a good idea to know your test results and keep alist of the medicines you take. Where can you learn more? Go to https://darren.MARIPOSA BIOTECH[employer]. org and sign in to your PROSimity account. Enter P252 in the [facility] box to learn more about \"Iliotibial Band Syndrome: Exercises. \"     If you do not have an account, please click on the \"Sign Up Now\" link. Current as of: March 9, 2022               Content Version: 13.3  © 2006-2022 Healthwise, Incorporated. Care instructions adapted under license by [facility] ([facility]). If you have questions about a medical condition or this instruction, always ask your healthcare professional. [address] any warranty or liability for your use of this information. Patient Education        Low Back Arthritis: Exercises  Introduction  Here are some examples of typical rehabilitation exercises for your condition. Start each exercise slowly. Ease off the exercise if you start to have pain. Your doctor or physical therapist will tell you when you can start theseexercises and which ones will work best for you. When you are not being active, find a comfortable position for rest. Some people are comfortable on the floor or a medium-firm bed with a small pillow under their head and another under their knees. Some people prefer to lie on their side with a pillow between their knees. Don't stay in one position fortoo long. Take short walks (10 to 20 minutes) every 2 to 3 hours. Avoid slopes, hills, and stairs until you feel better. Walk only distances you can manage withoutpain, especially leg pain. How to do the exercises  Pelvic tilt    1. Lie on your back with your knees bent. 2. \"Brace\" your stomach--tighten your muscles by pulling in and imagining your belly button moving toward your spine. 3. Press your lower back into the floor. You should feel your hips and pelvis rock back. 4. Hold for 6 seconds while breathing smoothly. 5. Relax and allow your pelvis and hips to rock forward. 6. Repeat 8 to 12 times. Back stretches    1. Get down on your hands and knees on the floor. 2. Relax your head and allow it to droop.  Round your back up toward the ceiling until you feel a nice stretch in your upper, middle, and lower back. Hold this stretch for as long as it feels comfortable, or about 15 to 30 seconds. 3. Return to the starting position with a flat back while you are on your hands and knees. 4. Let your back sway by pressing your stomach toward the floor. Lift your buttocks toward the ceiling. 5. Hold this position for 15 to 30 seconds. 6. Repeat 2 to 4 times. Follow-up care is a key part of your treatment and safety. Be sure to make and go to all appointments, and call your doctor if you are having problems. It's also a good idea to know your test results and keep alist of the medicines you take. Where can you learn more? Go to https://"ArrayPower[employer] Inc.".Living Lens Enterprise. org and sign in to your Arctrieval account. Enter V729 in the Solulink box to learn more about \"Low Back Arthritis: Exercises. \"     If you do not have an account, please click on the \"Sign Up Now\" link. Current as of: March 9, 2022               Content Version: 13.3  © 2172-1725 [place] [employer]. Care instructions adapted under license by [facility] ([facility]). If you have questions about a medical condition or this instruction, always ask your healthcare professional. [place] [address] any warranty or liability for your use of this information. Patient Education        Low Back Pain: Exercises  Introduction  Here are some examples of exercises for you to try. The exercises may be suggested for a condition or for rehabilitation. Start each exercise slowly. Ease off the exercises if you start to have pain. You will be told when to start these exercises and which ones will work bestfor you. How to do the exercises  Press-up    7. Lie on your stomach, supporting your body with your forearms. 8. Press your elbows down into the floor to raise your upper back.  As you do this, relax your stomach muscles and allow your back to arch without using your back muscles. As your press up, do not let your hips or pelvis come off the floor. 9. Hold for 15 to 30 seconds, then relax. 10. Repeat 2 to 4 times. Alternate arm and leg (bird dog) exercise    Do this exercise slowly. Try to keep your body straight at all times, and donot let one hip drop lower than the other. 7. Start on the floor, on your hands and knees. 8. Tighten your belly muscles. 9. Raise one leg off the floor, and hold it straight out behind you. Be careful not to let your hip drop down, because that will twist your trunk. 10. Hold for about 6 seconds, then lower your leg and switch to the other leg. 11. Repeat 8 to 12 times on each leg. 12. Over time, work up to holding for 10 to 30 seconds each time. 13. If you feel stable and secure with your leg raised, try raising the opposite arm straight out in front of you at the same time. Knee-to-chest exercise    1. Lie on your back with your knees bent and your feet flat on the floor. 2. Bring one knee to your chest, keeping the other foot flat on the floor (or keeping the other leg straight, whichever feels better on your lower back). 3. Keep your lower back pressed to the floor. Hold for at least 15 to 30 seconds. 4. Relax, and lower the knee to the starting position. 5. Repeat with the other leg. Repeat 2 to 4 times with each leg. 6. To get more stretch, put your other leg flat on the floor while pulling your knee to your chest.  Curl-ups    1. Lie on the floor on your back with your knees bent at a 90-degree angle. Your feet should be flat on the floor, about 12 inches from your buttocks. 2. Cross your arms over your chest. If this bothers your neck, try putting your hands behind your neck (not your head), with your elbows spread apart. 3. Slowly tighten your belly muscles and raise your shoulder blades off the floor.   4. Keep your head in line with your body, and do not press your chin to your chest.  5. Hold this position for 1 or 2 seconds, then slowly lower yourself back down to the floor. 6. Repeat 8 to 12 times. Pelvic tilt exercise    1. Lie on your back with your knees bent. 2. \"Brace\" your stomach. This means to tighten your muscles by pulling in and imagining your belly button moving toward your spine. You should feel like your back is pressing to the floor and your hips and pelvis are rocking back. 3. Hold for about 6 seconds while you breathe smoothly. 4. Repeat 8 to 12 times. Heel dig bridging    1. Lie on your back with both knees bent and your ankles bent so that only your heels are digging into the floor. Your knees should be bent about 90 degrees. 2. Then push your heels into the floor, squeeze your buttocks, and lift your hips off the floor until your shoulders, hips, and knees are all in a straight line. 3. Hold for about 6 seconds as you continue to breathe normally, and then slowly lower your hips back down to the floor and rest for up to 10 seconds. 4. Do 8 to 12 repetitions. Hamstring stretch in doorway    1. Lie on your back in a doorway, with one leg through the open door. 2. Slide your leg up the wall to straighten your knee. You should feel a gentle stretch down the back of your leg. 3. Hold the stretch for at least 15 to 30 seconds. Do not arch your back, point your toes, or bend either knee. Keep one heel touching the floor and the other heel touching the wall. 4. Repeat with your other leg. 5. Do 2 to 4 times for each leg. Hip flexor stretch    1. Kneel on the floor with one knee bent and one leg behind you. Place your forward knee over your foot. Keep your other knee touching the floor. 2. Slowly push your hips forward until you feel a stretch in the upper thigh of your rear leg. 3. Hold the stretch for at least 15 to 30 seconds. Repeat with your other leg. 4. Do 2 to 4 times on each side. Wall sit    1. Stand with your back 10 to 12 inches away from a wall.   2. Lean into the wall until your back is flat against it. 3. Slowly slide down until your knees are slightly bent, pressing your lower back into the wall. 4. Hold for about 6 seconds, then slide back up the wall. 5. Repeat 8 to 12 times. Follow-up care is a key part of your treatment and safety. Be sure to make and go to all appointments, and call your doctor if you are having problems. It's also a good idea to know your test results and keep alist of the medicines you take. Where can you learn more? Go to https://PostRankpeContracts and Grants.[place] [employer]. org and sign in to your Innorange Oy account. Enter Y197 in the Drywave box to learn more about \"Low Back Pain: Exercises. \"     If you do not have an account, please click on the \"Sign Up Now\" link. Current as of: March 9, 2022               Content Version: 13.3  © 2006-2022 SynapCell. Care instructions adapted under license by [facility]. If you have questions about a medical condition or this instruction, always ask your healthcare professional. [place] [address] any warranty or liability for your use of this information. Patient Education        Hip Bursitis: Care Instructions  Your Care Instructions     Bursitis is inflammation of the bursa. A bursa is a small sac of fluid that cushions a joint and helps it move easily. A bursa sits between a bone in the hip and the muscles and tendons in the thigh and buttock. Injury or overuse of the hip can cause bursitis. Activities that can lead to bursitis includetwisting and rapid joint movement. Bursitis can cause hip pain. Bursitis usually gets better if you avoid the activity that caused it. If pain lasts or gets worse despite home treatment, your doctor may draw fluid from the bursa through a needle. This may relieve your pain and help your doctor know if you have an infection. If so, your doctor will prescribe antibiotics.  If you have inflammation only, you may get a corticosteroid shot to reduce swellingand pain. Sometimes surgery is needed to drain or remove the bursa. Follow-up care is a key part of your treatment and safety. Be sure to make and go to all appointments, and call your doctor if you are having problems. It's also a good idea to know your test results and keep alist of the medicines you take. How can you care for yourself at home?  Put ice or a cold pack on your hip for 10 to 20 minutes at a time. Put a thin cloth between the ice and your skin.  After 3 days of using ice, you may use heat on your hip. You can use a hot water bottle, a heating pad set on low, or a warm, moist towel.  Rest your hip. Stop any activities that cause pain. Switch to activities that do not stress your hip.  Take your medicines exactly as prescribed. Call your doctor if you think you are having a problem with your medicine.  Ask your doctor if you can take an over-the-counter pain medicine, such as acetaminophen (Tylenol), ibuprofen (Advil, Motrin), or naproxen (Aleve). Be safe with medicines. Read and follow all instructions on the label.  To prevent stiffness, gently move the hip joint as much as you can without pain every day. As the pain gets better, keep doing range-of-motion exercises. Ask your doctor for exercises that will make the muscles around the hip joint stronger. Do these as directed.  You can slowly return to the activity that caused the pain, but do it with less effort until you can do it without pain or swelling. Be sure to warm up before and stretch after you do the activity. When should you call for help? Call your doctor now or seek immediate medical care if:     You have a fever.      You have increased swelling or redness in your hip.      You cannot use your hip, or the pain in your hip gets worse. Watch closely for changes in your health, and be sure to contact your doctor if:     You have pain for 2 weeks or longer despite home treatment.    Where can you learn more? Go to https://chpepiceweb.Wallarm. org and sign in to your Pavilion Data account. Enter U848 in the [facility] box to learn more about \"Hip Bursitis: Care Instructions. \"     If you do not have an account, please click on the \"Sign Up Now\" link. Current as of: March 9, 2022               Content Version: 13.3  © 2006-2022 userfox. Care instructions adapted under license by [facility] ([facility]). If you have questions about a medical condition or this instruction, always ask your healthcare professional. [place] [address] any warranty or liability for your use of this information. Patient Education        Hip Bursitis: Exercises  Introduction  Here are some examples of exercises for you to try. The exercises may be suggested for a condition or for rehabilitation. Start each exercise slowly. Ease off the exercises if you start to have pain. You will be told when to start these exercises and which ones will work bestfor you. How to do the exercises  Hip rotator stretch    1. Lie on your back with both knees bent and your feet flat on the floor. 2. Put the ankle of your affected leg on your opposite thigh near your knee. 3. Use your hand to gently push your knee away from your body until you feel a gentle stretch around your hip. 4. Hold the stretch for 15 to 30 seconds. 5. Repeat 2 to 4 times. 6. Repeat steps 1 through 5, but this time use your hand to gently pull your knee toward your opposite shoulder. Iliotibial band stretch    1. Lean sideways against a wall. If you are not steady on your feet, hold on to a chair or counter. 2. Stand on the leg with the affected hip, with that leg close to the wall. Then cross your other leg in front of it. 3. Let your affected hip drop out to the side of your body and against wall. Then lean away from your affected hip until you feel a stretch. 4. Hold the stretch for 15 to 30 seconds.   5. Repeat 2 to 4

## 2022-07-18 ENCOUNTER — OFFICE VISIT (OUTPATIENT)
Dept: ENT CLINIC | Age: 77
End: 2022-07-18
Payer: MEDICARE

## 2022-07-18 VITALS
HEART RATE: 66 BPM | DIASTOLIC BLOOD PRESSURE: 74 MMHG | HEIGHT: 65 IN | BODY MASS INDEX: 26.33 KG/M2 | SYSTOLIC BLOOD PRESSURE: 128 MMHG | WEIGHT: 158 LBS

## 2022-07-18 DIAGNOSIS — H93.8X1 MASS OF RIGHT EAR: ICD-10-CM

## 2022-07-18 DIAGNOSIS — H61.91 DISORDER OF EAR LOBE, RIGHT: Primary | ICD-10-CM

## 2022-07-18 PROCEDURE — 1123F ACP DISCUSS/DSCN MKR DOCD: CPT | Performed by: OTOLARYNGOLOGY

## 2022-07-18 PROCEDURE — 1036F TOBACCO NON-USER: CPT | Performed by: OTOLARYNGOLOGY

## 2022-07-18 PROCEDURE — 99214 OFFICE O/P EST MOD 30 MIN: CPT | Performed by: OTOLARYNGOLOGY

## 2022-07-18 PROCEDURE — 4130F TOPICAL PREP RX AOE: CPT | Performed by: OTOLARYNGOLOGY

## 2022-07-18 PROCEDURE — G8400 PT W/DXA NO RESULTS DOC: HCPCS | Performed by: OTOLARYNGOLOGY

## 2022-07-18 PROCEDURE — 1090F PRES/ABSN URINE INCON ASSESS: CPT | Performed by: OTOLARYNGOLOGY

## 2022-07-18 PROCEDURE — G8427 DOCREV CUR MEDS BY ELIG CLIN: HCPCS | Performed by: OTOLARYNGOLOGY

## 2022-07-18 PROCEDURE — G8417 CALC BMI ABV UP PARAM F/U: HCPCS | Performed by: OTOLARYNGOLOGY

## 2022-07-18 ASSESSMENT — ENCOUNTER SYMPTOMS
NAUSEA: 0
SORE THROAT: 0
FACIAL SWELLING: 0
RHINORRHEA: 0
SHORTNESS OF BREATH: 0
SINUS PRESSURE: 0
EYE ITCHING: 0
CHOKING: 0
VOICE CHANGE: 0
COUGH: 0
EYE PAIN: 0
TROUBLE SWALLOWING: 0
DIARRHEA: 0
EYE REDNESS: 0
SINUS PAIN: 0

## 2022-07-18 NOTE — LETTER
[facility]  [address]  Phone: [phone]  Fax: [phone] Dorene Sibley MD    July 18, 2022     Kevyn Rajput Peterson, [address]    Patient: Nathan Donohue   MR Number: [id number]   YOB: 1945   Date of Visit: 7/18/2022       Dear Jamee Nieves:    Thank you for referring Airam Houser to me for evaluation/treatment. Below are the relevant portions of my assessment and plan of care. Diagnosis Orders   1. Disorder of ear lobe, right        2. Mass of right ear              Schedule for in office excision of growing right submucosal ear lobule mass. Risks: Bleeding and infection, recurrence. Benefits: Removal and pathological diagnosis. Patient agreed to proceed. If you have questions, please do not hesitate to call me. I look forward to following Bard Lewis along with you.     Sincerely,      Dick Solis MD

## 2022-07-18 NOTE — PROGRESS NOTES
Subjective:      Patient ID: Radha Mooney is a 69 y.o. female. HPI  Chief Complaint   Patient presents with    ear lump       History of Present Illness  Head/Neck    James Essex is a(n) 69 y.o. female who presents with a 1 year history of a right ear mass. Lobule. Getting larger. Concerned. No pain. No overlying skin changes.      Patient Active Problem List   Diagnosis    Venous insufficiency of both lower extremities    Polyp of colon    Abdominal aortic atherosclerosis (HCC)    Hyperlipidemia    Benign essential HTN    Multinodular goiter    Osteopenia    PSVT (paroxysmal supraventricular tachycardia) ([facility] [address]    Vitamin D deficiency    B12 deficiency     Past Surgical History:   Procedure Laterality Date    BREAST LUMPECTOMY Right 1970    CATARACT REMOVAL WITH IMPLANT Bilateral 2012    Dr. Mello Gain  2018    TONSILLECTOMY AND ADENOIDECTOMY      11 yo     Family History   Problem Relation Age of Onset    Heart Disease Mother         CHF    Other Mother         PVD    Stroke Father 80    Coronary Art Dis Father         CHF    Atrial Fibrillation Sister     Hypertension Brother     High Cholesterol Brother     Breast Cancer Daughter 52        Stage zero, Double Mastectomy     Social History     Socioeconomic History    Marital status:      Spouse name: Not on file    Number of children: Not on file    Years of education: Not on file    Highest education level: Not on file   Occupational History    Not on file   Tobacco Use    Smoking status: Never    Smokeless tobacco: Never   Vaping Use    Vaping Use: Never used   Substance and Sexual Activity    Alcohol use: Never    Drug use: Never    Sexual activity: Yes     Partners: Male   Other Topics Concern    Not on file   Social History Narrative    Not on file     Social Determinants of Health     Financial Resource Strain: Not on file   Food Insecurity: Not on file   Transportation Needs: Not on file   Physical Activity: Not on file   Stress: Not on loss, nosebleeds, postnasal drip, rhinorrhea, sinus pressure, sinus pain, sneezing, sore throat, tinnitus, trouble swallowing and voice change. Eyes:  Negative for pain, redness, itching and visual disturbance. Respiratory:  Negative for cough, choking and shortness of breath. Gastrointestinal:  Negative for diarrhea and nausea. Endocrine: Negative for cold intolerance and heat intolerance. Objective:   Physical Exam  Constitutional:       General: She is not in acute distress. Appearance: She is well-developed. HENT:      Head: Not macrocephalic and not microcephalic. No Tristan's sign, contusion, right periorbital erythema, left periorbital erythema or laceration. Right Ear: Hearing, tympanic membrane, ear canal and external ear normal. No decreased hearing noted. No laceration, drainage, swelling or tenderness. No middle ear effusion. No foreign body. No mastoid tenderness. No hemotympanum. Tympanic membrane is not injected, perforated, retracted or bulging. Tympanic membrane has normal mobility. Left Ear: Hearing, tympanic membrane, ear canal and external ear normal. No decreased hearing noted. No laceration, drainage, swelling or tenderness. No middle ear effusion. No foreign body. No mastoid tenderness. No hemotympanum. Tympanic membrane is not injected, perforated, retracted or bulging. Tympanic membrane has normal mobility. Ears:      Carrillo exam findings: Does not lateralize. Right Rinne: AC > BC. Left Rinne: AC > BC. Nose: No mucosal edema or rhinorrhea. Mouth/Throat:      Pharynx: No oropharyngeal exudate or posterior oropharyngeal erythema. Tonsils: No tonsillar abscesses. Eyes:      Extraocular Movements:      Right eye: Normal extraocular motion and no nystagmus. Left eye: Normal extraocular motion and no nystagmus. Pupils:      Right eye: Pupil is reactive. Left eye: Pupil is reactive.    Neck:      Thyroid: No thyroid mass or thyromegaly. Musculoskeletal:      Cervical back: Normal range of motion and neck supple. Lymphadenopathy:      Head:      Right side of head: No preauricular, posterior auricular or occipital adenopathy. Left side of head: No preauricular, posterior auricular or occipital adenopathy. Cervical:      Right cervical: No superficial, deep or posterior cervical adenopathy. Left cervical: No superficial, deep or posterior cervical adenopathy. Skin:     Findings: No bruising, erythema or lesion. Neurological:      Mental Status: She is alert and oriented to person, place, and time. Psychiatric:         Attention and Perception: Attention normal.         Mood and Affect: Mood normal.         Speech: Speech normal.       Assessment:       Diagnosis Orders   1. Disorder of ear lobe, right        2. Mass of right ear                Plan:      Schedule for in office excision of growing right submucosal ear lobule mass. Risks: Bleeding and infection, recurrence. Benefits: Removal and pathological diagnosis. Patient agreed to proceed.          Neida Wilder MD

## 2022-07-25 ENCOUNTER — PROCEDURE VISIT (OUTPATIENT)
Dept: ENT CLINIC | Age: 77
End: 2022-07-25
Payer: MEDICARE

## 2022-07-25 DIAGNOSIS — H93.8X1 MASS OF RIGHT EAR: Primary | ICD-10-CM

## 2022-07-25 PROCEDURE — 11442 EXC FACE-MM B9+MARG 1.1-2 CM: CPT | Performed by: OTOLARYNGOLOGY

## 2022-07-25 RX ORDER — AMOXICILLIN 500 MG/1
500 CAPSULE ORAL 3 TIMES DAILY
Qty: 15 CAPSULE | Refills: 0 | Status: SHIPPED | OUTPATIENT
Start: 2022-07-25 | End: 2022-07-30

## 2022-07-25 NOTE — PROGRESS NOTES
Patient here for excision right subdermal mass. Has been growing larger with discomfort. Concern for neoplasm/cancer secondary to growth and pain. Excisional biopsy right earlobe subdermal mass Biopsy    Pre OP: subdermal mass right earlobe  Post op: Same. Anesthetics: 3 cc of 2% lido with epi    Procedure:     After verbal consent, 3 cc of 2% lidocaine with 1:100,000 epinephrine was injected around the right ear lobe mass for a field block. After anesthesia was obtained the patient was prepped and draped in sterile fashion. Using a 15 blade a 1.5 cm incision was made through the skin. Careful dissection was performed with iris scissors on the capsule of the mass in 360 degree fashion. The lesion was removed and sent for pathology. The wound was closed in one layer with 5-0 chronic. The procedure was completed. I performed the entire procedure myself. Amoxil 3 times a day for 5 days. Follow up in 2 weeks for incision check and pathology results.

## 2022-07-28 ENCOUNTER — TELEPHONE (OUTPATIENT)
Dept: ENT CLINIC | Age: 77
End: 2022-07-28

## 2022-07-28 NOTE — TELEPHONE ENCOUNTER
----- Message from Chandni Varela MD sent at 7/28/2022  7:37 AM EDT -----  Please let Ms. Alesha Mitchell know her ear lesion was a benign cyst.

## 2022-08-08 ENCOUNTER — OFFICE VISIT (OUTPATIENT)
Dept: ENT CLINIC | Age: 77
End: 2022-08-08

## 2022-08-08 VITALS — HEART RATE: 65 BPM | SYSTOLIC BLOOD PRESSURE: 115 MMHG | DIASTOLIC BLOOD PRESSURE: 67 MMHG

## 2022-08-08 DIAGNOSIS — H61.91 DISORDER OF EAR LOBE, RIGHT: Primary | ICD-10-CM

## 2022-08-08 PROCEDURE — 99024 POSTOP FOLLOW-UP VISIT: CPT | Performed by: OTOLARYNGOLOGY

## 2022-08-12 ENCOUNTER — TELEPHONE (OUTPATIENT)
Dept: INTERNAL MEDICINE CLINIC | Age: 77
End: 2022-08-12

## 2022-08-12 NOTE — TELEPHONE ENCOUNTER
Patient is looking for annual skin exam and wants to see someone in [place]. The derm she saw last year is not her preference.   Routed to Dr Francoise Pond DISCHARGE

## 2022-08-12 NOTE — TELEPHONE ENCOUNTER
She should get Booster #2 ASAP. Glad Meloxicam is helping. She cannot be on this indefinitely. OK for now. Not sure when Flu shots here.   She can get at Pharmacy

## 2022-08-12 NOTE — TELEPHONE ENCOUNTER
Patient is calling for the following reasons:     She forgot to ask at her appointment in July when is she due for another [employer] She states she got her last booster shot in September of 2021.    2.  Patient states Dr. Andres Dhillon prescribed:  meloxicam (MOBIC) 15 MG tablet Take 1 tablet by mouth daily (with breakfast)   For Bursitis in her hip. She states \"this helped tremendously\", and is requesting to know \"if she can go on this indefinitely. \"    3. Patient will be traveling in October and wants to make sure that her Booster, and Flu Shot are in Full effect before traveling. What time frame should she get these? Please contact patient @ phone # provided.

## 2022-08-16 ENCOUNTER — HOSPITAL ENCOUNTER (OUTPATIENT)
Dept: GENERAL RADIOLOGY | Age: 77
Discharge: HOME | End: 2022-08-16
Payer: MEDICARE

## 2022-08-16 DIAGNOSIS — Z78.0 POSTMENOPAUSAL: ICD-10-CM

## 2022-08-16 DIAGNOSIS — M85.80 OSTEOPENIA, UNSPECIFIED LOCATION: ICD-10-CM

## 2022-08-16 PROCEDURE — 77080 DXA BONE DENSITY AXIAL: CPT

## 2022-09-01 DIAGNOSIS — M47.816 LUMBAR FACET ARTHROPATHY: ICD-10-CM

## 2022-09-01 DIAGNOSIS — M47.816 LUMBAR SPONDYLOSIS: ICD-10-CM

## 2022-09-01 DIAGNOSIS — I10 BENIGN ESSENTIAL HTN: ICD-10-CM

## 2022-09-01 DIAGNOSIS — M70.61 GREATER TROCHANTERIC BURSITIS, RIGHT: ICD-10-CM

## 2022-09-01 DIAGNOSIS — M51.36 DDD (DEGENERATIVE DISC DISEASE), LUMBAR: ICD-10-CM

## 2022-09-01 DIAGNOSIS — M76.31 ILIOTIBIAL BAND TENDINITIS OF RIGHT SIDE: ICD-10-CM

## 2022-09-02 RX ORDER — MELOXICAM 15 MG/1
TABLET ORAL
Qty: 30 TABLET | Refills: 1 | Status: SHIPPED | OUTPATIENT
Start: 2022-09-02

## 2022-09-02 RX ORDER — HYDROCHLOROTHIAZIDE 12.5 MG/1
CAPSULE, GELATIN COATED ORAL
Qty: 90 CAPSULE | Refills: 1 | Status: SHIPPED | OUTPATIENT
Start: 2022-09-02

## 2022-11-21 ENCOUNTER — SCHEDULED TELEPHONE ENCOUNTER (OUTPATIENT)
Dept: INTERNAL MEDICINE CLINIC | Age: 77
End: 2022-11-21
Payer: MEDICARE

## 2022-11-21 ENCOUNTER — TELEPHONE (OUTPATIENT)
Dept: INTERNAL MEDICINE CLINIC | Age: 77
End: 2022-11-21

## 2022-11-21 DIAGNOSIS — J10.1 INFLUENZA A: Primary | ICD-10-CM

## 2022-11-21 PROCEDURE — 99441 PR PHYS/QHP TELEPHONE EVALUATION 5-10 MIN: CPT | Performed by: INTERNAL MEDICINE

## 2022-11-21 RX ORDER — OSELTAMIVIR PHOSPHATE 75 MG/1
75 CAPSULE ORAL 2 TIMES DAILY
Qty: 10 CAPSULE | Refills: 0 | Status: SHIPPED | OUTPATIENT
Start: 2022-11-21 | End: 2022-11-26

## 2022-11-21 SDOH — ECONOMIC STABILITY: FOOD INSECURITY: WITHIN THE PAST 12 MONTHS, YOU WORRIED THAT YOUR FOOD WOULD RUN OUT BEFORE YOU GOT MONEY TO BUY MORE.: NEVER TRUE

## 2022-11-21 SDOH — ECONOMIC STABILITY: FOOD INSECURITY: WITHIN THE PAST 12 MONTHS, THE FOOD YOU BOUGHT JUST DIDN'T LAST AND YOU DIDN'T HAVE MONEY TO GET MORE.: NEVER TRUE

## 2022-11-21 ASSESSMENT — SOCIAL DETERMINANTS OF HEALTH (SDOH): HOW HARD IS IT FOR YOU TO PAY FOR THE VERY BASICS LIKE FOOD, HOUSING, MEDICAL CARE, AND HEATING?: NOT HARD AT ALL

## 2022-11-21 NOTE — TELEPHONE ENCOUNTER
Tried to help patient submit an evisit. She couldn't get logged in. She would like to do a telephone visit with you. Do you want me to add this tomorrow or Wednesday?  Let me know what you think

## 2022-11-21 NOTE — PROGRESS NOTES
Teressa Ruiz is a 69 y.o. female evaluated via telephone on 11/21/2022 for Fever (101 this morning, tested positive for the flu today), Chills, Fatigue, and Cough  . [facility]) Physicians  Internal Medicine  Patient Encounter  [address] [facility], D.O., [facility]     Chief Complaint   Patient presents with    Fever     101 this morning, tested positive for the flu today    Chills    Fatigue    Cough       HPI: Patient is a 69-year-old white female evaluated with complaint of a positive test for influenza. She was with her  at his [address] [place] [address] office and the patient was given a flu test.  The patient started with symptoms yesterday morning including fatigue, cough and a temperature of 101. She has had associated chills and aches. She denies any shortness of breath. Documentation:  I communicated with the patient and/or health care decision maker about influenza. Details of this discussion including any medical advice provided: See below    Encounter Diagnosis   Name Primary? Influenza A Yes       PLAN:  #1 push fluids  #2 Tamiflu 75 mg twice daily for 5 days  #3 use Tylenol as needed for elevated temperature  #4 use over-the-counter remedies such as Mucinex or TheraFlu for symptoms  #5 all should symptoms persist, worsen or if new symptoms develop. Total Time: minutes: 5-10 minutes    Teressa Ruiz was evaluated through a synchronous (real-time) audio encounter. Patient identification was verified at the start of the visit. She (or guardian if applicable) is aware that this is a billable service, which includes applicable co-pays. This visit was conducted with the patient's (and/or legal guardian's) verbal consent. She has not had a related appointment within my department in the past 7 days or scheduled within the next 24 hours. The patient was located at Home: [address] [place] [address].   The provider was located at [facility] [address],  [address] [facility] [address].    Note: not billable if this call serves to triage the patient into an appointment for the relevant concern    [address] [place] [address],

## 2022-11-22 ENCOUNTER — TELEPHONE (OUTPATIENT)
Dept: INTERNAL MEDICINE CLINIC | Age: 77
End: 2022-11-22

## 2022-11-22 NOTE — TELEPHONE ENCOUNTER
Discussed with patient. At this point she will continue the Tamiflu. I recommended Mucinex DM. She states that she is actually feeling better today. Antibiotics are not appropriate.

## 2022-11-22 NOTE — TELEPHONE ENCOUNTER
Patient states she has developed a productive cough since speaking with Dr. Sameer Caro yesterday. She believes she should begin taking an antibiotic. Patient confirmed she did begin taking the Tamiflu. She would like this to be called in to:  [address] [phone] - f [phone]     Please contact patient with any additional questions.

## 2022-12-17 DIAGNOSIS — I10 BENIGN ESSENTIAL HTN: ICD-10-CM

## 2022-12-19 RX ORDER — OLMESARTAN MEDOXOMIL 5 MG/1
TABLET ORAL
Qty: 180 TABLET | Refills: 1 | Status: SHIPPED | OUTPATIENT
Start: 2022-12-19

## 2023-01-04 ENCOUNTER — TELEPHONE (OUTPATIENT)
Dept: INTERNAL MEDICINE CLINIC | Age: 78
End: 2023-01-04

## 2023-01-04 NOTE — TELEPHONE ENCOUNTER
Patient is now calling to be scheduled. They told her at Dr. Quyen Saini office she is experiencing a-fib. Patient does not want to go to ER, per Dr. Quyen Saini recommendation. Patient would like to be scheduled tomorrow with Dr. Ellis Hood. She state she feels OK, she is just very tired. She claims her  got a new cpap machine and it is causing her to have lack of sleep. Please advise.

## 2023-01-04 NOTE — TELEPHONE ENCOUNTER
Desirae Rueda at [facility] is calling to state patient's podiatrist Dr. Ponce Malave is wondering if patient could be seen for an emergent appt tomorrow. She has an irregular HR and her HR has felt very fast today. Can patient be added to tomorrow or Friday? Please advise. Please contact Dr. Nikhil Sultana office at [phone] with any additional questions. Patient is currently at their office.

## 2023-01-04 NOTE — TELEPHONE ENCOUNTER
Spoke with Dr. Francoise Pond verbally about this. He has advise me to send the patient to the er to be elevated today. Or she can see one of his partners, but he strongly encourages her to go to the er. Spoke with patient, she will go to [facility] now.

## 2023-01-05 NOTE — TELEPHONE ENCOUNTER
Patient called and stated she did not go to the ER. She made an appt with the PA at her Cardiologist's office. She is being seen tomorrow at Dr. Saavedra Ethan office. Please contact patient with any additional questions. Patient states she feels fine today.

## 2023-01-16 ENCOUNTER — OFFICE VISIT (OUTPATIENT)
Dept: INTERNAL MEDICINE CLINIC | Age: 78
End: 2023-01-16
Payer: MEDICARE

## 2023-01-16 VITALS
HEART RATE: 51 BPM | BODY MASS INDEX: 26.36 KG/M2 | WEIGHT: 158.2 LBS | SYSTOLIC BLOOD PRESSURE: 128 MMHG | RESPIRATION RATE: 14 BRPM | DIASTOLIC BLOOD PRESSURE: 64 MMHG | HEIGHT: 65 IN | OXYGEN SATURATION: 98 %

## 2023-01-16 DIAGNOSIS — R73.9 HYPERGLYCEMIA: ICD-10-CM

## 2023-01-16 DIAGNOSIS — I10 BENIGN ESSENTIAL HTN: ICD-10-CM

## 2023-01-16 DIAGNOSIS — E78.5 HYPERLIPIDEMIA, UNSPECIFIED HYPERLIPIDEMIA TYPE: ICD-10-CM

## 2023-01-16 DIAGNOSIS — I87.2 VENOUS INSUFFICIENCY OF BOTH LOWER EXTREMITIES: ICD-10-CM

## 2023-01-16 DIAGNOSIS — I70.0 ABDOMINAL AORTIC ATHEROSCLEROSIS (HCC): ICD-10-CM

## 2023-01-16 DIAGNOSIS — R01.1 HEART MURMUR: ICD-10-CM

## 2023-01-16 DIAGNOSIS — Z00.00 MEDICARE ANNUAL WELLNESS VISIT, SUBSEQUENT: ICD-10-CM

## 2023-01-16 DIAGNOSIS — Z00.00 MEDICARE ANNUAL WELLNESS VISIT, SUBSEQUENT: Primary | ICD-10-CM

## 2023-01-16 DIAGNOSIS — I47.1 PSVT (PAROXYSMAL SUPRAVENTRICULAR TACHYCARDIA) (HCC): ICD-10-CM

## 2023-01-16 DIAGNOSIS — R20.2 PARESTHESIA OF BOTH FEET: ICD-10-CM

## 2023-01-16 DIAGNOSIS — Z13.6 SCREENING FOR CARDIOVASCULAR CONDITION: ICD-10-CM

## 2023-01-16 LAB
A/G RATIO: 2.2 (ref 1.1–2.2)
ALBUMIN SERPL-MCNC: 4.3 G/DL (ref 3.4–5)
ALP BLD-CCNC: 72 U/L (ref 40–129)
ALT SERPL-CCNC: 21 U/L (ref 10–40)
ANION GAP SERPL CALCULATED.3IONS-SCNC: 11 MMOL/L (ref 3–16)
AST SERPL-CCNC: 17 U/L (ref 15–37)
BASOPHILS ABSOLUTE: 0 K/UL (ref 0–0.2)
BASOPHILS RELATIVE PERCENT: 0.6 %
BILIRUB SERPL-MCNC: 0.5 MG/DL (ref 0–1)
BUN BLDV-MCNC: 18 MG/DL (ref 7–20)
CALCIUM SERPL-MCNC: 10.2 MG/DL (ref 8.3–10.6)
CHLORIDE BLD-SCNC: 101 MMOL/L (ref 99–110)
CHOLESTEROL, TOTAL: 136 MG/DL (ref 0–199)
CO2: 30 MMOL/L (ref 21–32)
CREAT SERPL-MCNC: 0.6 MG/DL (ref 0.6–1.2)
EOSINOPHILS ABSOLUTE: 0.2 K/UL (ref 0–0.6)
EOSINOPHILS RELATIVE PERCENT: 3.5 %
GFR SERPL CREATININE-BSD FRML MDRD: >60 ML/MIN/{1.73_M2}
GLUCOSE BLD-MCNC: 93 MG/DL (ref 70–99)
HCT VFR BLD CALC: 42.8 % (ref 36–48)
HDLC SERPL-MCNC: 42 MG/DL (ref 40–60)
HEMOGLOBIN: 14.2 G/DL (ref 12–16)
LDL CHOLESTEROL CALCULATED: 69 MG/DL
LYMPHOCYTES ABSOLUTE: 1.6 K/UL (ref 1–5.1)
LYMPHOCYTES RELATIVE PERCENT: 26 %
MCH RBC QN AUTO: 30.9 PG (ref 26–34)
MCHC RBC AUTO-ENTMCNC: 33.1 G/DL (ref 31–36)
MCV RBC AUTO: 93.3 FL (ref 80–100)
MONOCYTES ABSOLUTE: 0.6 K/UL (ref 0–1.3)
MONOCYTES RELATIVE PERCENT: 9.3 %
NEUTROPHILS ABSOLUTE: 3.7 K/UL (ref 1.7–7.7)
NEUTROPHILS RELATIVE PERCENT: 60.6 %
PDW BLD-RTO: 13.1 % (ref 12.4–15.4)
PLATELET # BLD: 214 K/UL (ref 135–450)
PMV BLD AUTO: 9.4 FL (ref 5–10.5)
POTASSIUM SERPL-SCNC: 4.8 MMOL/L (ref 3.5–5.1)
RBC # BLD: 4.59 M/UL (ref 4–5.2)
SODIUM BLD-SCNC: 142 MMOL/L (ref 136–145)
TOTAL PROTEIN: 6.3 G/DL (ref 6.4–8.2)
TRIGL SERPL-MCNC: 125 MG/DL (ref 0–150)
VLDLC SERPL CALC-MCNC: 25 MG/DL
WBC # BLD: 6 K/UL (ref 4–11)

## 2023-01-16 PROCEDURE — G0446 INTENS BEHAVE THER CARDIO DX: HCPCS | Performed by: INTERNAL MEDICINE

## 2023-01-16 PROCEDURE — G0439 PPPS, SUBSEQ VISIT: HCPCS | Performed by: INTERNAL MEDICINE

## 2023-01-16 PROCEDURE — 3078F DIAST BP <80 MM HG: CPT | Performed by: INTERNAL MEDICINE

## 2023-01-16 PROCEDURE — 3074F SYST BP LT 130 MM HG: CPT | Performed by: INTERNAL MEDICINE

## 2023-01-16 PROCEDURE — G8484 FLU IMMUNIZE NO ADMIN: HCPCS | Performed by: INTERNAL MEDICINE

## 2023-01-16 PROCEDURE — 1123F ACP DISCUSS/DSCN MKR DOCD: CPT | Performed by: INTERNAL MEDICINE

## 2023-01-16 ASSESSMENT — LIFESTYLE VARIABLES
HOW MANY STANDARD DRINKS CONTAINING ALCOHOL DO YOU HAVE ON A TYPICAL DAY: PATIENT DOES NOT DRINK
HOW OFTEN DO YOU HAVE A DRINK CONTAINING ALCOHOL: NEVER

## 2023-01-16 ASSESSMENT — PATIENT HEALTH QUESTIONNAIRE - PHQ9
SUM OF ALL RESPONSES TO PHQ QUESTIONS 1-9: 0
SUM OF ALL RESPONSES TO PHQ QUESTIONS 1-9: 0
SUM OF ALL RESPONSES TO PHQ9 QUESTIONS 1 & 2: 0
2. FEELING DOWN, DEPRESSED OR HOPELESS: 0
SUM OF ALL RESPONSES TO PHQ QUESTIONS 1-9: 0
1. LITTLE INTEREST OR PLEASURE IN DOING THINGS: 0
SUM OF ALL RESPONSES TO PHQ QUESTIONS 1-9: 0

## 2023-01-16 NOTE — PROGRESS NOTES
[address] [facility]  Internal Medicine  Patient Encounter  Aditya Quijano D.O., [facility] Annual Wellness Visit  Maya Courser  1/16/2023    Subjective Maya Courser is here for Medicare AWV    She was at Podiatrist's office 1/4/2023. They should she was in A. Fib. They advised she go to the ER. She felt fine and did not want to go. We advised the same. She did not go. She got in to see the EP specialist who she has seen in the past for PSVT. She saw the NP. She was placed on a 7 days event recorder. She has a follow up.           Medical/Surgical Histories     Past Medical History:   Diagnosis Date    Abdominal aortic atherosclerosis ([address] 07/29/2020    Allergic rhinitis     Colon polyps     COVID-19 virus infection 12/26/2021    Home Rapid test    Hearing loss     Hyperlipidemia     Hypertension     Lung disease     Multinodular goiter 07/29/2020    Osteopenia     PSVT (paroxysmal supraventricular tachycardia) ([address] 07/24/2020    Diagnosed during stress test at [facility]    Rash     Tinnitus     Venous insufficiency of both lower extremities 07/29/2020    Vitamin D deficiency 07/29/2020          Past Surgical History:   Procedure Laterality Date    BREAST LUMPECTOMY Right 1970    CATARACT REMOVAL WITH IMPLANT Bilateral 2012    Dr. Crocker Pulse      9 yo           Medications/Allergies     Current Outpatient Medications   Medication Sig    olmesartan (BENICAR) 5 MG tablet TAKE 2 TABLETS BY MOUTH EVERY DAY    hydroCHLOROthiazide (MICROZIDE) 12.5 MG capsule TAKE 1 CAPSULE BY MOUTH EVERY DAY IN THE MORNING    atorvastatin (LIPITOR) 10 MG tablet TAKE 1 TABLET BY MOUTH EVERY DAY    aspirin 81 MG EC tablet Take 81 mg by mouth daily    Calcium Carbonate-Vitamin D (CALCIUM 600/VITAMIN D PO) Take 1 tablet by mouth 2 times daily    vitamin B-12 (CYANOCOBALAMIN) 1000 MCG tablet Take 1,000 mcg by mouth daily    Multiple Vitamins-Minerals (ICAPS AREDS 2 PO) Take 1 tablet by mouth 2 times daily     No current facility-administered medications for this visit. Allergies   Allergen Reactions    Latex Itching    Dust Mite Extract          Substance Use History     Social History     Tobacco Use    Smoking status: Never    Smokeless tobacco: Never   Vaping Use    Vaping Use: Never used   Substance Use Topics    Alcohol use: Never    Drug use: Never          Family History     Family History   Problem Relation Age of Onset    Heart Disease Mother         CHF    Other Mother         PVD    Stroke Father 80    Coronary Art Dis Father         CHF    Atrial Fibrillation Sister     Hypertension Brother     High Cholesterol Brother     Breast Cancer Daughter 52        Stage zero, Double Mastectomy            CARE TEAM  (Including outside providers/suppliers regularly involved in providing care):   Patient Care Team:  Kirsty Mendez DO as PCP - General (Internal Medicine)  Kirsty Mendez DO as PCP - [facility]  Sunitha Carson (Internal Medicine)  Lux Shay MD (Cardiac Electrophysiology)  CLAY Lucas CNP (Nurse Practitioner)  Virgie Caceres MD as Consulting Physician (Gastroenterology)  CLAY Montiel CNP as Nurse Practitioner (Nurse Practitioner)      Patient's complete Health Risk Assessment and screening values have been reviewed and are found in Flowsheets. The following risks were reviewed today and where indicated follow up appointments were made and/or referrals ordered.     Positive Risk Factor Screenings with Interventions:             General Health and ACP:  General  In general, how would you say your health is?: Very Good  In the past 7 days, have you experienced any of the following: New or Increased Pain, New or Increased Fatigue, Loneliness, Social Isolation, Stress or Anger?: No  Do you get the social and emotional support that you need?: (!) No  Do you have a Living Will?: Yes    Advance Directives       Power of  Living Will ACP-Advance Directive ACP-Power of Carmen Mcintosh on 01/19/22 Filed on 01/19/22 Shari Gaston            Safety:  Do you have either shower bars, grab bars, non-slip mats or non-slip surfaces in your shower or bathtub?: (!) No No Positive Risk Factors identified today. CV Risk Counseling:  Patient was asked about her current diet and exercise habits, and personalized advice was provided regarding recommended lifestyle changes. Patient's individual cardiovascular disease risk factors, including dyslipidemia, hypertension, and Abd Astherosclerosis , were discussed, as well as the likely benefits of lifestyle changes. Based upon patient's motivation to change her behavior, the following plan was agreed upon to work toward lowering cardiovascular disease risk: low saturated fat, low cholesterol diet, increase physical activity, as tolerated, and continue current medicine . Aspirin use for primary prevention of cardiovascular disease for men 45-79 and women 55-79: Indicated- continue daily aspirin. Educational materials for lifestyle changes were provided. Patient will follow-up in 6 month(s) with PCP. Provider spent 15 minutes counseling patient. ROS:  CV:  As above. She has had some palpitations occasionally. NEURO:  + Numbness in the toes. She signed up for a Vascular screening. OBJECTIVE     Vitals:    01/16/23 0955   BP: 128/64   Pulse: 51   Resp: 14   SpO2: 98%   Weight: 158 lb 3.2 oz (71.8 kg)   Height: 5' 4.5\" (1.638 m)     Body mass index is 26.74 kg/m². Wt Readings from Last 3 Encounters:   01/16/23 158 lb 3.2 oz (71.8 kg)   07/18/22 158 lb (71.7 kg)   07/14/22 158 lb 9.6 oz (71.9 kg)     BP Readings from Last 3 Encounters:   01/16/23 128/64   08/08/22 115/67   07/18/22 128/74          GEN:  69 y.o. female who is in NAD, A&O. She appears stated age and well nourished. She is cooperative and pleasant.     HEAD:  NC/AT, no lesions. EYES:  ALLA, EOMI, No scleral icterus or conjunctival injection or discharge. Visual fields in tact to confrontation. EARS:  EAC's clear, TM's normal.  MOUTH & THROAT:  Oral cavity is clear without mucosal lesions. Tongue is midline. Dentition is in good repair. No pharyngeal erythema or exudate. NECK:  Supple. Full ROM. Trachea is midline. No increased JVD. No thyromegaly or nodules. No masses  LYMPH: No C/SC/A/F nodes  CARDIAC:  S1S2 NL. Regular rhythm.  + Occasional Ectopy (Extrasystoles). + Soft systolic murmur. VASC:  Pedal pulses 2/4. Carotid upstrokes 2+. No bruits noted. PULM:  Lungs are CTA. Symmetric breath sounds noted. AP Diameter NL. GI:  Abdomen is soft and nontender. No distension. No organomegaly. No masses. No pulsatile masses. EXT:  No Cyanosis or clubbing. No edema. SKIN: Warm and dry, normal turgor, no rash or lesions of concern. NEURO: No focal deficits. PSYCH:  Mood and affect NL. Judgement and insight NL. Reviewed and updated this visit:  Tobacco  Allergies  Meds  Med Hx  Surg Hx  Soc Hx  Fam Hx        ASSESSMENT/PLAN     ASSESSMENT/PLAN:    1. Medicare annual wellness visit, subsequent  Care gaps identified and addressed  Cancer screenings are up-to-date  Strongly recommended she obtain her COVID-19 vaccine updated booster  - [place] Intensive Behavior Counseling for Cardiovascular Diseases, 8-15 minutes []  - Hemoglobin A1C; Future  - CBC with Auto Differential; Future  - Comprehensive Metabolic Panel; Future  - Lipid Panel; Future    2. Abdominal aortic atherosclerosis (HCC)  Condition is asymptomatic  Check lipid profile  Continue blood pressure and cholesterol control  Continue low-dose aspirin    3.  PSVT (paroxysmal supraventricular tachycardia) ([address]  Patient does have occasional palpitations  PACs noted on exam  Patient is due to follow-up with EP to review a 7-day cardiac monitor  - ECHO Complete 2D W Doppler W Color    4. Screening for cardiovascular condition    - [place] Intensive Behavior Counseling for Cardiovascular Diseases, 8-15 minutes []    5. Paresthesia of both feet  This is a new complaint  No exam findings  - AFL - Sun Marcus DO, Neurology (EMG, NCV), [place]    6. Venous insufficiency of both lower extremities  Well-controlled with compression stockings    7. Heart murmur  Check echo  - ECHO Complete 2D W Doppler W Color    8. Benign essential HTN    - CBC with Auto Differential; Future  - Comprehensive Metabolic Panel; Future  - Lipid Panel; Future    9. Hyperlipidemia, unspecified hyperlipidemia type    - Comprehensive Metabolic Panel; Future  - Lipid Panel; Future    10. Hyperglycemia    - Hemoglobin A1C; Future  - Comprehensive Metabolic Panel; Future      Has vascular screening planned [facility] the summer [facility].  I advised that she have a report sent to the office        Recommendations for Preventive Services Due: see orders and patient instructions/AVS.  Recommended screening schedule for the next 5-10 years is provided to the patient in written form: see Patient Instructions/AVS.    Return for Medicare Annual Wellness Visit in 1 year.

## 2023-01-16 NOTE — PATIENT INSTRUCTIONS
Personalized Preventive Plan for Brett Alex - 1/16/2023  Medicare offers a range of preventive health benefits. Some of the tests and screenings are paid in full while other may be subject to a deductible, co-insurance, and/or copay. Some of these benefits include a comprehensive review of your medical history including lifestyle, illnesses that may run in your family, and various assessments and screenings as appropriate. After reviewing your medical record and screening and assessments performed today your provider may have ordered immunizations, labs, imaging, and/or referrals for you. A list of these orders (if applicable) as well as your Preventive Care list are included within your After Visit Summary for your review. Other Preventive Recommendations:    A preventive eye exam performed by an eye specialist is recommended every 1-2 years to screen for glaucoma; cataracts, macular degeneration, and other eye disorders. A preventive dental visit is recommended every 6 months. Try to get at least 150 minutes of exercise per week or 10,000 steps per day on a pedometer . Order or download the FREE \"Exercise & Physical Activity: Your Everyday Guide\" from [employer]. Call 2-124.683.8735 or search [employer] online. You need 0022-8571 mg of calcium and 3364-7530 IU of vitamin D per day. It is possible to meet your calcium requirement with diet alone, but a vitamin D supplement is usually necessary to meet this goal.  When exposed to the sun, use a sunscreen that protects against both UVA and UVB radiation with an SPF of 30 or greater. Reapply every 2 to 3 hours or after sweating, drying off with a towel, or swimming. Always wear a seat belt when traveling in a car. Advance Directives: Care Instructions  Overview  An advance directive is a legal way to state your wishes at the end of your life.  It tells your family and your doctor what to do if you can't say what you want. There are two main types of advance directives. You can change them any time your wishes change. Living will. This form tells your family and your doctor your wishes about life support and other treatment. The form is also called a declaration. Medical power of . This form lets you name a person to make treatment decisions for you when you can't speak for yourself. This person is called a health care agent (health care proxy, health care surrogate). The form is also called a durable power of  for health care. If you do not have an advance directive, decisions about your medical care may be made by a family member, or by a doctor or a  who doesn't know you. It may help to think of an advance directive as a gift to the people who care for you. If you have one, they won't have to make tough decisions by themselves. For more information, including forms for your state, see the [address] [facility] website (www.caringinfo.org/planning/advance-directives/). Follow-up care is a key part of your treatment and safety. Be sure to make and go to all appointments, and call your doctor if you are having problems. It's also a good idea to know your test results and keep a list of the medicines you take. What should you include in an advance directive? Many states have a unique advance directive form. (It may ask you to address specific issues.) Or you might use a universal form that's approved by many states. If your form doesn't tell you what to address, it may be hard to know what to include in your advance directive. Use the questions below to help you get started. Who do you want to make decisions about your medical care if you are not able to? What life-support measures do you want if you have a serious illness that gets worse over time or can't be cured? What are you most afraid of that might happen?  (Maybe you're afraid of having pain, losing your independence, or being kept alive by machines.)  Where would you prefer to die? (Your home? A hospital? A nursing home?)  Do you want to donate your organs when you die? Do you want certain Jew practices performed before you die? When should you call for help? Be sure to contact your doctor if you have any questions. Where can you learn more? Go to http://www.payne.com/ and enter R264 to learn more about \"Advance Directives: Care Instructions. \"  Current as of: June 16, 2022               Content Version: 13.5  © 5759-9175 [employer]. Care instructions adapted under license by [facility] ([facility]). If you have questions about a medical condition or this instruction, always ask your healthcare professional. [address] any warranty or liability for your use of this information. A Healthy Heart: Care Instructions  Your Care Instructions     Coronary artery disease, also called heart disease, occurs when a substance called plaque builds up in the vessels that supply oxygen-rich blood to your heart muscle. This can narrow the blood vessels and reduce blood flow. A heart attack happens when blood flow is completely blocked. A high-fat diet, smoking, and other factors increase the risk of heart disease. Your doctor has found that you have a chance of having heart disease. You can do lots of things to keep your heart healthy. It may not be easy, but you can change your diet, exercise more, and quit smoking. These steps really work to lower your chance of heart disease. Follow-up care is a key part of your treatment and safety. Be sure to make and go to all appointments, and call your doctor if you are having problems. It's also a good idea to know your test results and keep a list of the medicines you take. How can you care for yourself at home? Diet    Use less salt when you cook and eat. This helps lower your blood pressure. Taste food before salting.  Add only a little salt when you think you need it. With time, your taste buds will adjust to less salt. Eat fewer snack items, fast foods, canned soups, and other high-salt, high-fat, processed foods. Read food labels and try to avoid saturated and trans fats. They increase your risk of heart disease by raising cholesterol levels. Limit the amount of solid fat-butter, margarine, and shortening-you eat. Use olive, peanut, or canola oil when you cook. Bake, broil, and steam foods instead of frying them. Eat a variety of fruit and vegetables every day. Dark green, deep orange, red, or yellow fruits and vegetables are especially good for you. Examples include spinach, carrots, peaches, and berries. Foods high in fiber can reduce your cholesterol and provide important vitamins and minerals. High-fiber foods include whole-grain cereals and breads, oatmeal, beans, brown rice, citrus fruits, and apples. Eat lean proteins. Heart-healthy proteins include seafood, lean meats and poultry, eggs, beans, peas, nuts, seeds, and soy products. Limit drinks and foods with added sugar. These include candy, desserts, and soda pop. Lifestyle changes    If your doctor recommends it, get more exercise. Walking is a good choice. Bit by bit, increase the amount you walk every day. Try for at least 30 minutes on most days of the week. You also may want to swim, bike, or do other activities. Do not smoke. If you need help quitting, talk to your doctor about stop-smoking programs and medicines. These can increase your chances of quitting for good. Quitting smoking may be the most important step you can take to protect your heart. It is never too late to quit. Limit alcohol to 2 drinks a day for men and 1 drink a day for women. Too much alcohol can cause health problems. Manage other health problems such as diabetes, high blood pressure, and high cholesterol.  If you think you may have a problem with alcohol or drug use, talk to your doctor. Medicines    Take your medicines exactly as prescribed. Call your doctor if you think you are having a problem with your medicine. If your doctor recommends aspirin, take the amount directed each day. Make sure you take aspirin and not another kind of pain reliever, such as acetaminophen (Tylenol). When should you call for help? Call 911 if you have symptoms of a heart attack. These may include:    Chest pain or pressure, or a strange feeling in the chest.     Sweating. Shortness of breath. Pain, pressure, or a strange feeling in the back, neck, jaw, or upper belly or in one or both shoulders or arms. Lightheadedness or sudden weakness. A fast or irregular heartbeat. After you call 911, the  may tell you to chew 1 adult-strength or 2 to 4 low-dose aspirin. Wait for an ambulance. Do not try to drive yourself. Watch closely for changes in your health, and be sure to contact your doctor if you have any problems. Where can you learn more? Go to http://www.payne.com/ and enter F075 to learn more about \"A Healthy Heart: Care Instructions. \"  Current as of: September 7, 2022               Content Version: 13.5  © 2006-2022 [employer]. Care instructions adapted under license by [facility] ([facility]). If you have questions about a medical condition or this instruction, always ask your healthcare professional. [place] [address] any warranty or liability for your use of this information.

## 2023-01-17 LAB
ESTIMATED AVERAGE GLUCOSE: 111.2 MG/DL
HBA1C MFR BLD: 5.5 %

## 2023-01-18 ENCOUNTER — TELEPHONE (OUTPATIENT)
Dept: INTERNAL MEDICINE CLINIC | Age: 78
End: 2023-01-18

## 2023-01-18 NOTE — TELEPHONE ENCOUNTER
Patient is calling to have her order for ECHO faxed over to [facility]. They need this order to be faxed in order to get patient scheduled. Please fax to F#: [phone]    Called patient to provide her the number for [facility] and she told me she cannot take the number down as she is currently very stressed while driving in the rain. Offered to send contact information over [employer] and patient denied this option. Advised her to contact office back when done driving. . if she calls back please tell her to call P#: [phone] to be scheduled.

## 2023-02-27 RX ORDER — ATORVASTATIN CALCIUM 10 MG/1
TABLET, FILM COATED ORAL
Qty: 90 TABLET | Refills: 3 | Status: SHIPPED | OUTPATIENT
Start: 2023-02-27

## 2023-03-08 DIAGNOSIS — I10 BENIGN ESSENTIAL HTN: ICD-10-CM

## 2023-03-08 RX ORDER — HYDROCHLOROTHIAZIDE 12.5 MG/1
CAPSULE, GELATIN COATED ORAL
Qty: 90 CAPSULE | Refills: 1 | Status: SHIPPED | OUTPATIENT
Start: 2023-03-08

## 2023-04-06 ENCOUNTER — TELEPHONE (OUTPATIENT)
Dept: INTERNAL MEDICINE CLINIC | Age: 78
End: 2023-04-06

## 2023-04-16 DIAGNOSIS — I10 BENIGN ESSENTIAL HTN: ICD-10-CM

## 2023-04-17 RX ORDER — OLMESARTAN MEDOXOMIL 5 MG/1
TABLET ORAL
Qty: 180 TABLET | Refills: 1 | Status: SHIPPED | OUTPATIENT
Start: 2023-04-17

## 2023-04-17 NOTE — TELEPHONE ENCOUNTER
Medication:   Requested Prescriptions     Pending Prescriptions Disp Refills    olmesartan (BENICAR) 5 MG tablet [Pharmacy Med Name: OLMESARTAN MEDOXOMIL 5 MG TAB] 180 tablet 1     Sig: TAKE 2 TABLETS BY MOUTH EVERY DAY     Last Filled:  12/19/22    Last appt: 1/16/2023   Next appt: 7/6/2023    Last OARRS: No flowsheet data found.

## 2023-09-04 DIAGNOSIS — I10 BENIGN ESSENTIAL HTN: ICD-10-CM

## 2023-09-05 RX ORDER — HYDROCHLOROTHIAZIDE 12.5 MG/1
CAPSULE, GELATIN COATED ORAL
Qty: 90 CAPSULE | Refills: 1 | Status: SHIPPED | OUTPATIENT
Start: 2023-09-05

## 2023-09-05 NOTE — TELEPHONE ENCOUNTER
Last appointment: 1/16/2023  Next appointment: Visit date not found  Last refill: 3/8/2023  Sent Fullscreen message to schedule due/overdue appointment.

## 2023-09-19 ENCOUNTER — TELEPHONE (OUTPATIENT)
Dept: INTERNAL MEDICINE CLINIC | Age: 78
End: 2023-09-19

## 2023-09-19 NOTE — TELEPHONE ENCOUNTER
I spoke with patient. She states she has moved to [facility] and is now under the care of Dr. Kendall Stevens.     ----- Message -----   From: Nolia Cranker, MA   Sent: 9/5/2023   To: Jaron Hernandez   Subject: Lj Dodd Notification                       . .Dear Faye Henao,     We have received a refill request for you. You are overdue for an appointment with Dr. Rivas Mail. Please call the office at [phone] to schedule your follow-up appointment.      Nolia Cranker, MA

## 2023-09-20 NOTE — TELEPHONE ENCOUNTER
Your are indicated as an Empaneled Provider. I can not do anything with that. I did add Dr. Maude Verma as PCP.

## 2023-12-01 DIAGNOSIS — I10 BENIGN ESSENTIAL HTN: ICD-10-CM

## 2023-12-01 RX ORDER — OLMESARTAN MEDOXOMIL 5 MG/1
TABLET ORAL
Qty: 180 TABLET | Refills: 1 | OUTPATIENT
Start: 2023-12-01

## 2024-02-28 DIAGNOSIS — I10 BENIGN ESSENTIAL HTN: ICD-10-CM

## 2024-02-28 RX ORDER — HYDROCHLOROTHIAZIDE 12.5 MG/1
CAPSULE, GELATIN COATED ORAL
Qty: 90 CAPSULE | Refills: 1 | OUTPATIENT
Start: 2024-02-28

## 2024-09-25 NOTE — TELEPHONE ENCOUNTER
Pt calling LM on  to schedule Annual skin exam appt with  pls return call back @ [phone] last seen in January of 2021 as NP Price (Do Not Change): 0.00 Detail Level: Simple Instructions: This plan will send the code FBSE to the PM system.  DO NOT or CHANGE the price.